# Patient Record
Sex: FEMALE | Race: WHITE | Employment: FULL TIME | URBAN - METROPOLITAN AREA
[De-identification: names, ages, dates, MRNs, and addresses within clinical notes are randomized per-mention and may not be internally consistent; named-entity substitution may affect disease eponyms.]

---

## 2023-03-20 DIAGNOSIS — I10 ESSENTIAL HYPERTENSION: Primary | ICD-10-CM

## 2023-03-20 DIAGNOSIS — E78.00 HYPERCHOLESTEROLEMIA: ICD-10-CM

## 2023-03-20 DIAGNOSIS — E53.8 VITAMIN B12 DEFICIENCY: ICD-10-CM

## 2023-03-20 PROBLEM — E66.3 OVERWEIGHT (BMI 25.0-29.9): Status: ACTIVE | Noted: 2023-03-20

## 2023-03-20 PROBLEM — J45.30 ASTHMA, MILD PERSISTENT (HHS-HCC): Status: ACTIVE | Noted: 2023-03-20

## 2023-03-20 PROBLEM — T75.3XXA MOTION SICKNESS: Status: ACTIVE | Noted: 2023-03-20

## 2023-03-20 RX ORDER — CYANOCOBALAMIN 1000 UG/ML
1000 INJECTION, SOLUTION INTRAMUSCULAR; SUBCUTANEOUS
Qty: 1 ML | Refills: 11 | Status: SHIPPED | OUTPATIENT
Start: 2023-03-20 | End: 2023-03-20

## 2023-03-20 RX ORDER — VALSARTAN 80 MG/1
1 TABLET ORAL DAILY
COMMUNITY
Start: 2020-07-09 | End: 2023-03-20 | Stop reason: SDUPTHER

## 2023-03-20 RX ORDER — ALBUTEROL SULFATE 90 UG/1
AEROSOL, METERED RESPIRATORY (INHALATION)
COMMUNITY
Start: 2016-06-03 | End: 2023-11-30 | Stop reason: WASHOUT

## 2023-03-20 RX ORDER — ETONOGESTREL AND ETHINYL ESTRADIOL VAGINAL RING .015; .12 MG/D; MG/D
RING VAGINAL
COMMUNITY
Start: 2015-08-10 | End: 2023-11-13 | Stop reason: SDUPTHER

## 2023-03-20 RX ORDER — CYANOCOBALAMIN 1000 UG/ML
INJECTION, SOLUTION INTRAMUSCULAR; SUBCUTANEOUS
COMMUNITY
Start: 2021-12-07 | End: 2023-03-20 | Stop reason: SDUPTHER

## 2023-03-20 RX ORDER — ALBUTEROL SULFATE 0.83 MG/ML
SOLUTION RESPIRATORY (INHALATION)
COMMUNITY
Start: 2021-03-27 | End: 2023-11-30 | Stop reason: WASHOUT

## 2023-03-20 RX ORDER — SCOLOPAMINE TRANSDERMAL SYSTEM 1 MG/1
PATCH, EXTENDED RELEASE TRANSDERMAL
COMMUNITY
Start: 2021-09-08 | End: 2023-08-10 | Stop reason: SDUPTHER

## 2023-03-20 RX ORDER — ROSUVASTATIN CALCIUM 10 MG/1
1 TABLET, COATED ORAL DAILY
COMMUNITY
Start: 2021-10-29 | End: 2023-03-20 | Stop reason: SDUPTHER

## 2023-03-20 RX ORDER — FLUTICASONE PROPIONATE AND SALMETEROL 113; 14 UG/1; UG/1
1 POWDER, METERED RESPIRATORY (INHALATION) DAILY
COMMUNITY
Start: 2022-04-14 | End: 2023-08-10 | Stop reason: ALTCHOICE

## 2023-03-20 RX ORDER — EPINEPHRINE 0.3 MG/.3ML
0.3 INJECTION SUBCUTANEOUS
COMMUNITY
Start: 2022-08-19

## 2023-03-20 RX ORDER — VALSARTAN 80 MG/1
80 TABLET ORAL DAILY
Qty: 90 TABLET | Refills: 3 | Status: SHIPPED | OUTPATIENT
Start: 2023-03-20 | End: 2023-03-20

## 2023-03-20 RX ORDER — ROSUVASTATIN CALCIUM 10 MG/1
10 TABLET, COATED ORAL DAILY
Qty: 90 TABLET | Refills: 3 | Status: SHIPPED | OUTPATIENT
Start: 2023-03-20 | End: 2023-03-20

## 2023-08-09 ASSESSMENT — ENCOUNTER SYMPTOMS
CHILLS: 0
FREQUENCY: 0
FEVER: 0
POLYPHAGIA: 0
TREMORS: 0
DIZZINESS: 0
HEADACHES: 0
HEMATURIA: 0
SLEEP DISTURBANCE: 0
WHEEZING: 0
DIARRHEA: 0
COLOR CHANGE: 0
FATIGUE: 0
VOMITING: 0
DIFFICULTY URINATING: 0
FACIAL SWELLING: 0
ARTHRALGIAS: 0
NERVOUS/ANXIOUS: 0
CONSTIPATION: 0
CHOKING: 0
COUGH: 0
ABDOMINAL DISTENTION: 0
WEAKNESS: 0
MYALGIAS: 0
SORE THROAT: 0
EYE DISCHARGE: 0
ANAL BLEEDING: 0
PALPITATIONS: 0
APPETITE CHANGE: 0
POLYDIPSIA: 0
JOINT SWELLING: 0
ABDOMINAL PAIN: 0
SHORTNESS OF BREATH: 0
EYE PAIN: 0
CONFUSION: 0
CHEST TIGHTNESS: 0
NUMBNESS: 0
NAUSEA: 0

## 2023-08-09 NOTE — PROGRESS NOTES
"Subjective   Patient ID: Lakia Gomez is a 28 y.o. female with a history of asthma, hypertension, and hyperlipidemia who presents for Annual Exam.    HPI the patient is presented for physical exam.  She does not voice any minor major complaints.  Her asthma is stable.  No recent exacerbation.  Denies wheezing or shortness of breath.  She exercises 3-4 times a week by HIIT workout at home.  Denies smoking, using illicit drugs or drinking alcohol.    Review of Systems   Constitutional:  Negative for appetite change, chills, fatigue and fever.   HENT:  Negative for congestion, ear pain, facial swelling, hearing loss, nosebleeds and sore throat.    Eyes:  Negative for pain, discharge and visual disturbance.   Respiratory:  Negative for cough, choking, chest tightness, shortness of breath and wheezing.    Cardiovascular:  Negative for chest pain, palpitations and leg swelling.   Gastrointestinal:  Negative for abdominal distention, abdominal pain, anal bleeding, constipation, diarrhea, nausea and vomiting.   Endocrine: Negative for cold intolerance, heat intolerance, polydipsia, polyphagia and polyuria.   Genitourinary:  Negative for difficulty urinating, frequency, hematuria and urgency.   Musculoskeletal:  Negative for arthralgias, gait problem, joint swelling and myalgias.   Skin:  Negative for color change and rash.   Neurological:  Negative for dizziness, tremors, syncope, weakness, numbness and headaches.   Psychiatric/Behavioral:  Negative for behavioral problems, confusion, sleep disturbance and suicidal ideas. The patient is not nervous/anxious.        Objective   /88   Temp 36.3 °C (97.3 °F)   Ht 1.6 m (5' 3\")   Wt 92.1 kg (203 lb)   LMP 07/30/2023   BMI 35.96 kg/m²     Physical Exam  Constitutional:       General: She is not in acute distress.     Appearance: Normal appearance.   HENT:      Head: Normocephalic and atraumatic.      Nose: Nose normal.   Eyes:      Extraocular Movements: Extraocular " movements intact.      Conjunctiva/sclera: Conjunctivae normal.      Pupils: Pupils are equal, round, and reactive to light.   Cardiovascular:      Rate and Rhythm: Normal rate and regular rhythm.      Pulses: Normal pulses.      Heart sounds: Normal heart sounds.   Pulmonary:      Effort: Pulmonary effort is normal.      Breath sounds: Normal breath sounds.   Abdominal:      General: Bowel sounds are normal.      Palpations: Abdomen is soft.   Musculoskeletal:         General: Normal range of motion.      Cervical back: Normal range of motion and neck supple.   Neurological:      General: No focal deficit present.      Mental Status: She is alert and oriented to person, place, and time.   Psychiatric:         Mood and Affect: Mood normal.         Behavior: Behavior normal.         Thought Content: Thought content normal.         Judgment: Judgment normal.         Assessment/Plan       Complete physical exam.  We obtained fasting blood work including CBC, CMP, lipid panel.  Continue to exercise exercise at least 30 minutes daily  Healthy diet recommended  Follow-up with gynecology tomorrow  Pap smear 2021  Follow-up with dentist twice year  Follow-up with dermatology yearly for annual exam    Nausea during menstrual periods  Take Zofran as needed    Asthma  Stable  Continue Symbicort daily  Albuterol inhaler and nebulizer as needed  Follow up with allergy     Sweaty hands  Continue Drysol as needed    Vitamin D deficiency  Continue vitamin D 1000 units daily    Motion sickness  The patient is going on a cruise in September  Scopolamine patches are prescribed     High blood pressure   Continue valsartan 80 mg daily  No added salt diet, do not add salt to your food  Try to exercise every other day for 30 minutes    Hyperlipidemia  Continue with the low fat, low cholesterol diet  I recommend Mediterranean diet, which include fish, chicken, vegetables and olive oil  Exercise daily for 30 minutes at least 3 times a  week  Continue rosuvastatin 10 mg at bedtime  Report any side effects, such as,  Muscle ache. Muscle weakness, abdominal pain or discomfort     Vitamin B 12 deficiency  Continue vitamin B12 injections monthly    BMI 35.96 kg/m²  Elevated BMI, ideal BMI is between 23-26 kg/m2  Low fat, low cholesterol diet, low carbohydrate diet  Continue to exercise 3-4 times a week  by HIIT workout    Follow-up in 3-6 months

## 2023-08-10 ENCOUNTER — OFFICE VISIT (OUTPATIENT)
Dept: PRIMARY CARE | Facility: CLINIC | Age: 28
End: 2023-08-10
Payer: COMMERCIAL

## 2023-08-10 VITALS
BODY MASS INDEX: 35.97 KG/M2 | DIASTOLIC BLOOD PRESSURE: 88 MMHG | TEMPERATURE: 97.3 F | HEIGHT: 63 IN | WEIGHT: 203 LBS | SYSTOLIC BLOOD PRESSURE: 138 MMHG

## 2023-08-10 DIAGNOSIS — J45.40 MODERATE PERSISTENT ASTHMA WITHOUT COMPLICATION (HHS-HCC): ICD-10-CM

## 2023-08-10 DIAGNOSIS — T75.3XXD MOTION SICKNESS, SUBSEQUENT ENCOUNTER: ICD-10-CM

## 2023-08-10 DIAGNOSIS — I10 ESSENTIAL HYPERTENSION: ICD-10-CM

## 2023-08-10 DIAGNOSIS — Z00.00 ROUTINE ADULT HEALTH MAINTENANCE: Primary | ICD-10-CM

## 2023-08-10 PROBLEM — G89.29 CHRONIC BILATERAL LOW BACK PAIN WITHOUT SCIATICA: Status: ACTIVE | Noted: 2018-10-02

## 2023-08-10 PROBLEM — Z91.010 PEANUT ALLERGY: Status: ACTIVE | Noted: 2023-08-10

## 2023-08-10 PROBLEM — M54.50 CHRONIC BILATERAL LOW BACK PAIN WITHOUT SCIATICA: Status: ACTIVE | Noted: 2018-10-02

## 2023-08-10 PROBLEM — N93.8 DUB (DYSFUNCTIONAL UTERINE BLEEDING): Status: ACTIVE | Noted: 2023-08-10

## 2023-08-10 PROBLEM — R11.0 NAUSEA IN ADULT: Status: ACTIVE | Noted: 2023-08-10

## 2023-08-10 PROBLEM — Z91.018 NUT ALLERGY: Status: ACTIVE | Noted: 2023-08-10

## 2023-08-10 PROBLEM — J30.1 ALLERGIC RHINITIS DUE TO POLLEN: Status: ACTIVE | Noted: 2023-08-10

## 2023-08-10 PROCEDURE — 99395 PREV VISIT EST AGE 18-39: CPT | Performed by: PHYSICIAN ASSISTANT

## 2023-08-10 PROCEDURE — 85025 COMPLETE CBC W/AUTO DIFF WBC: CPT | Performed by: PHYSICIAN ASSISTANT

## 2023-08-10 PROCEDURE — 3075F SYST BP GE 130 - 139MM HG: CPT | Performed by: PHYSICIAN ASSISTANT

## 2023-08-10 PROCEDURE — 80053 COMPREHEN METABOLIC PANEL: CPT | Performed by: PHYSICIAN ASSISTANT

## 2023-08-10 PROCEDURE — 80061 LIPID PANEL: CPT | Performed by: PHYSICIAN ASSISTANT

## 2023-08-10 PROCEDURE — 3079F DIAST BP 80-89 MM HG: CPT | Performed by: PHYSICIAN ASSISTANT

## 2023-08-10 PROCEDURE — 1036F TOBACCO NON-USER: CPT | Performed by: PHYSICIAN ASSISTANT

## 2023-08-10 RX ORDER — TRETINOIN 0.25 MG/G
CREAM TOPICAL
COMMUNITY
Start: 2023-08-06 | End: 2023-11-30 | Stop reason: WASHOUT

## 2023-08-10 RX ORDER — ALUMINUM CHLORIDE 20 %
SOLUTION, NON-ORAL TOPICAL
COMMUNITY
Start: 2022-11-09 | End: 2023-11-01 | Stop reason: ALTCHOICE

## 2023-08-10 RX ORDER — BUDESONIDE AND FORMOTEROL FUMARATE DIHYDRATE 160; 4.5 UG/1; UG/1
AEROSOL RESPIRATORY (INHALATION)
COMMUNITY
Start: 2023-04-04

## 2023-08-10 RX ORDER — AZELASTINE HCL 205.5 UG/1
SPRAY NASAL 2 TIMES DAILY
COMMUNITY
Start: 2023-04-04

## 2023-08-10 RX ORDER — CLINDAMYCIN PHOSPHATE 10 UG/ML
LOTION TOPICAL
COMMUNITY
Start: 2023-08-05 | End: 2023-11-01 | Stop reason: ALTCHOICE

## 2023-08-10 RX ORDER — ONDANSETRON 4 MG/1
TABLET, ORALLY DISINTEGRATING ORAL
COMMUNITY
Start: 2023-06-13 | End: 2023-11-30 | Stop reason: SDUPTHER

## 2023-08-10 RX ORDER — VIT C/E/ZN/COPPR/LUTEIN/ZEAXAN 250MG-90MG
1000 CAPSULE ORAL
COMMUNITY

## 2023-08-10 RX ORDER — MONTELUKAST SODIUM 10 MG/1
10 TABLET ORAL NIGHTLY
COMMUNITY
End: 2023-08-10 | Stop reason: ALTCHOICE

## 2023-08-10 RX ORDER — KETOTIFEN FUMARATE 0.35 MG/ML
SOLUTION/ DROPS OPHTHALMIC
COMMUNITY
Start: 2023-04-04 | End: 2023-11-30 | Stop reason: WASHOUT

## 2023-08-10 RX ORDER — SCOLOPAMINE TRANSDERMAL SYSTEM 1 MG/1
1 PATCH, EXTENDED RELEASE TRANSDERMAL
Qty: 4 PATCH | Refills: 0 | Status: SHIPPED | OUTPATIENT
Start: 2023-08-10 | End: 2023-08-10

## 2023-08-10 ASSESSMENT — PROMIS GLOBAL HEALTH SCALE
RATE_QUALITY_OF_LIFE: EXCELLENT
RATE_MENTAL_HEALTH: EXCELLENT
RATE_AVERAGE_FATIGUE: MILD
RATE_AVERAGE_PAIN: 0
RATE_PHYSICAL_HEALTH: GOOD
RATE_GENERAL_HEALTH: VERY GOOD
CARRYOUT_PHYSICAL_ACTIVITIES: COMPLETELY
EMOTIONAL_PROBLEMS: RARELY
RATE_SOCIAL_SATISFACTION: EXCELLENT
CARRYOUT_SOCIAL_ACTIVITIES: EXCELLENT

## 2023-08-10 ASSESSMENT — COLUMBIA-SUICIDE SEVERITY RATING SCALE - C-SSRS
2. HAVE YOU ACTUALLY HAD ANY THOUGHTS OF KILLING YOURSELF?: NO
6. HAVE YOU EVER DONE ANYTHING, STARTED TO DO ANYTHING, OR PREPARED TO DO ANYTHING TO END YOUR LIFE?: NO
1. IN THE PAST MONTH, HAVE YOU WISHED YOU WERE DEAD OR WISHED YOU COULD GO TO SLEEP AND NOT WAKE UP?: NO

## 2023-08-10 ASSESSMENT — PATIENT HEALTH QUESTIONNAIRE - PHQ9
2. FEELING DOWN, DEPRESSED OR HOPELESS: NOT AT ALL
SUM OF ALL RESPONSES TO PHQ9 QUESTIONS 1 AND 2: 0
1. LITTLE INTEREST OR PLEASURE IN DOING THINGS: NOT AT ALL

## 2023-08-10 ASSESSMENT — ENCOUNTER SYMPTOMS
DEPRESSION: 0
OCCASIONAL FEELINGS OF UNSTEADINESS: 0
LOSS OF SENSATION IN FEET: 0

## 2023-08-11 ENCOUNTER — TELEPHONE (OUTPATIENT)
Dept: PRIMARY CARE | Facility: CLINIC | Age: 28
End: 2023-08-11
Payer: COMMERCIAL

## 2023-08-11 NOTE — TELEPHONE ENCOUNTER
----- Message from Johanna Bullard PA-C sent at 8/11/2023 12:32 PM EDT -----  Please call her with blood test results.  Everything is normal

## 2023-09-02 LAB
ALLERGEN ANIMAL: CAT DANDER IGE (KU/L): <0.1 KU/L
ALLERGEN ANIMAL: DOG DANDER IGE (KU/L): <0.1 KU/L
ALLERGEN GRASS: BERMUDA GRASS (CYNODON DACTYLON) IGE (KU/L): <0.1 KU/L
ALLERGEN GRASS: JOHNSON GRASS (SORGHUM HALEPENSE) IGE (KU/L): <0.1 KU/L
ALLERGEN GRASS: MEADOW GRASS, KENTUCKY BLUE (POA PRATENSIS )IGE (KU/L): <0.1 KU/L
ALLERGEN GRASS: TIMOTHY GRASS (PHLEUM PRATENSE) IGE (KU/L): <0.1 KU/L
ALLERGEN INSECT: COCKROACH IGE: <0.1 KU/L
ALLERGEN MICROORGANISM: ALTERNARIA ALTERNATA IGE (KU/L): <0.1 KU/L
ALLERGEN MICROORGANISM: ASPERGILLUS FUMIGATUS IGE (KU/L): <0.1 KU/L
ALLERGEN MICROORGANISM: CLADOSPORIUM HERBARUM IGE (KU/L): <0.1 KU/L
ALLERGEN MICROORGANISM: PENICILLIUM CHRYSOGENUM (P. NOTATUM) IGE (KU/L): <0.1 KU/L
ALLERGEN MITE: DERMATOPHAGOIDES FARINAE (HOUSE DUST MITE) IGE (KU/L): <0.1 KU/L
ALLERGEN MITE: DERMATOPHAGOIDES PTERONYSSINUS (HOUSE DUST MITE) IGE (KU/L): <0.1 KU/L
ALLERGEN TREE: BOX-ELDER (ACER NEGUNDO) IGE (KU/L): <0.1 KU/L
ALLERGEN TREE: COMMON SILVER BIRCH (BETULA VERRUCOSA) IGE (KU/L): <0.1 KU/L
ALLERGEN TREE: COTTONWOOD (POPULUS DELTOIDES) IGE (KU/L): <0.1 KU/L
ALLERGEN TREE: ELM (ULMUS AMERICANA) IGE (KU/L): <0.1 KU/L
ALLERGEN TREE: MAPLE LEAF SYCAMORE, LONDON PLANE IGE (KU/L): <0.1 KU/L
ALLERGEN TREE: MOUNTAIN JUNIPER (JUNIPERUS SABINOIDES) IGE (KU/L): <0.1 KU/L
ALLERGEN TREE: MULBERRY (MORUS ALBA) IGE (KU/L): <0.1 KU/L
ALLERGEN TREE: OAK (QUERCUS ALBA) IGE (KU/L): <0.1 KU/L
ALLERGEN TREE: PECAN, HICKORY (CARYA PECAN) IGE (KU/L): <0.1 KU/L
ALLERGEN TREE: WALNUT IGE: <0.1 KU/L
ALLERGEN TREE: WHITE ASH (FRAXINUS AMERICANA) IGE (KU/L): <0.1 KU/L
ALLERGEN WEED: COMMON PIGWEED (AMARANTHUS RETROFLEXUS) IGE (KU/L): <0.1 KU/L
ALLERGEN WEED: COMMON RAGWEED (AMB. ARTEMISIIFOLIA/A. ELATIOR) IGE (KU/L): <0.1 KU/L
ALLERGEN WEED: GOOSEFOOT, LAMB'S QUARTERS (CHENOPODIUM ALBUM) IGE (KU/L): <0.1 KU/L
ALLERGEN WEED: PLANTAIN (ENGLISH), RIBWORT (PLANTAGO LANCEOLATA) IGE (KU/L): <0.1 KU/L
ALLERGEN WEED: PRICKLY SALTWORT/RUSSIAN THISTLE (SALSOLA KALI) IGE (KU/L): <0.1 KU/L
ALLERGEN WEED: SHEEP SORREL (RUMEX ACETOSELLA) IGE (KU/L): <0.1 KU/L
IMMUNOCAP IGE: 24.6 KU/L (ref 0–214)
IMMUNOCAP INTERPRETATION: NORMAL

## 2023-09-07 LAB
ALLERGEN ANIMAL: MOUSE EPITHELIUM IGE (KU/L): <0.1 KU/L
ALLERGEN GRASS: SWEET VERNAL GRASS (ANTHOXANTHUM ODORATUM) IGE (KU/L): <0.1 KU/L
IMMUNOCAP INTERPRETATION (ARUP): NORMAL

## 2023-10-27 PROBLEM — N92.6 IRREGULAR MENSES: Status: ACTIVE | Noted: 2023-10-27

## 2023-10-27 PROBLEM — D22.4 MELANOCYTIC NEVI OF SCALP AND NECK: Status: ACTIVE | Noted: 2023-05-03

## 2023-10-27 PROBLEM — D22.60 MELANOCYTIC NEVI OF UNSPECIFIED UPPER LIMB, INCLUDING SHOULDER: Status: ACTIVE | Noted: 2023-05-03

## 2023-10-27 PROBLEM — L70.0 ACNE VULGARIS: Status: ACTIVE | Noted: 2023-05-03

## 2023-10-27 PROBLEM — D22.5 MELANOCYTIC NEVI OF TRUNK: Status: ACTIVE | Noted: 2023-05-03

## 2023-10-27 PROBLEM — N92.1 MENORRHAGIA WITH IRREGULAR CYCLE: Status: ACTIVE | Noted: 2023-10-27

## 2023-10-27 PROBLEM — D22.70 MELANOCYTIC NEVI OF UNSPECIFIED LOWER LIMB, INCLUDING HIP: Status: ACTIVE | Noted: 2023-05-03

## 2023-10-27 PROBLEM — N88.9 LESION OF CERVIX: Status: ACTIVE | Noted: 2023-10-27

## 2023-10-27 PROBLEM — L81.4 OTHER MELANIN HYPERPIGMENTATION: Status: ACTIVE | Noted: 2023-05-03

## 2023-10-27 RX ORDER — MONTELUKAST SODIUM 10 MG/1
10 TABLET ORAL NIGHTLY
COMMUNITY
End: 2023-11-01 | Stop reason: ALTCHOICE

## 2023-11-01 ENCOUNTER — PHARMACY VISIT (OUTPATIENT)
Dept: PHARMACY | Facility: CLINIC | Age: 28
End: 2023-11-01
Payer: COMMERCIAL

## 2023-11-01 ENCOUNTER — OFFICE VISIT (OUTPATIENT)
Dept: DERMATOLOGY | Facility: CLINIC | Age: 28
End: 2023-11-01
Payer: COMMERCIAL

## 2023-11-01 DIAGNOSIS — D22.9 MULTIPLE BENIGN NEVI: ICD-10-CM

## 2023-11-01 DIAGNOSIS — L70.0 ACNE VULGARIS: Primary | ICD-10-CM

## 2023-11-01 DIAGNOSIS — L74.519 PRIMARY FOCAL HYPERHIDROSIS: ICD-10-CM

## 2023-11-01 PROCEDURE — 3075F SYST BP GE 130 - 139MM HG: CPT | Performed by: DERMATOLOGY

## 2023-11-01 PROCEDURE — 99214 OFFICE O/P EST MOD 30 MIN: CPT | Performed by: DERMATOLOGY

## 2023-11-01 PROCEDURE — 3079F DIAST BP 80-89 MM HG: CPT | Performed by: DERMATOLOGY

## 2023-11-01 PROCEDURE — RXMED WILLOW AMBULATORY MEDICATION CHARGE

## 2023-11-01 PROCEDURE — 1036F TOBACCO NON-USER: CPT | Performed by: DERMATOLOGY

## 2023-11-01 RX ORDER — SPIRONOLACTONE 50 MG/1
50 TABLET, FILM COATED ORAL DAILY
Qty: 60 TABLET | Refills: 4 | Status: SHIPPED | OUTPATIENT
Start: 2023-11-01 | End: 2024-04-22 | Stop reason: SDUPTHER

## 2023-11-01 RX ORDER — CLINDAMYCIN PHOSPHATE 10 UG/ML
LOTION TOPICAL
Qty: 60 ML | Refills: 11 | Status: SHIPPED | OUTPATIENT
Start: 2023-11-01 | End: 2024-10-30

## 2023-11-01 RX ORDER — BENZOYL PEROXIDE 50 MG/ML
LIQUID TOPICAL
Qty: 237 G | Refills: 11 | Status: SHIPPED | OUTPATIENT
Start: 2023-11-01 | End: 2024-10-30

## 2023-11-01 NOTE — PROGRESS NOTES
Subjective     Lakia Gomez is a 28 y.o. female who presents for the following: Skin Check, Acne (BPO wash, Clindamycin lotion, Tretinoin cream. ), and Excessive Sweating (Drysol. ).         Review of Systems:  No other skin or systemic complaints other than what is documented elsewhere in the note.    The following portions of the chart were reviewed this encounter and updated as appropriate:   Tobacco  Allergies  Meds  Problems  Med Hx  Surg Hx         Skin Cancer History  No skin cancer on file.      Specialty Problems          Dermatology Problems    Acne vulgaris    Melanocytic nevi of scalp and neck    Melanocytic nevi of trunk    Melanocytic nevi of unspecified lower limb, including hip    Melanocytic nevi of unspecified upper limb, including shoulder    Other melanin hyperpigmentation        Objective   Well appearing patient in no apparent distress; mood and affect are within normal limits.    A full skin examination was performed. All findings within normal limits unless otherwise noted below.    Assessment/Plan   1. Acne vulgaris  Head - Anterior (Face)  Few open and close comedones on forehead, cheeks, nose. Two cystic erythematous small nodules along chin. Chest and back are clear    -mild, predominantly comedonal, without evidence of scarring  -We reviewed the pathogenesis of acne in detail including multifactorial contributing components including components of follicular occlusion, hormonal influences, and inflammatory processes.   -Treatment options discussed with the patient and family.   -Stop use of Tretinoin 0.025%  cream due to dryness and irritation.  -Continue use of an benzoyl peroxide wash to face, chest, and back 1-2x/day. Warned that benzoyl peroxide may bleach sheets and towels.   -Continue use of clindamycin 1% lotion to face, chest, and back once daily in the morning  -For patients with hormonally mediated acne, hormonal therapies may be of benefit, including oral  contraceptive pills and spironolactone.  Spironolactone was reviewed in detail including mechanism of action.  We reviewed side effects associated with it including (but not limited to) hyperkalemia, birth defects, dizziness, breast tenderness, and spotting. In healthy individuals with no history of renal dysfunction, monitoring potassium levels during therapy is not required. Begin use of spironolactone 50mg daily for two weeks. Then take 75mg for 1 week. Then remain at 100mg daily..  -Efficacy for any new acne regimen may take 6-8 weeks prior to seeing improvement  -Acne may initially flare prior to improving.  -Photographs taken today    Related Procedures  Follow Up In Dermatology    2. Primary focal hyperhidrosis (2)  Left Hand - Anterior, Right Hand - Anterior  No evidence of sweating today    Well controlled on drysol applied once per week. Will provide refills today.    3. Multiple benign nevi  scattered regular brown macules and papules     Benign melanocytic nevi  - Discussed benign nature and that no treatment is necessary unless it becomes painful or increases in size. Patient opts for clinical monitoring at this time.    - Sun protective behavior reviewed and encouraged including the use of over-the-counter sunscreen with SPF30+ daily (reapply every 1.5 hours when outdoors), UPF clothing, broad rimmed hats, sunglasses, and avoidance of midday sun. Home skin monitoring encouraged and how to monitor for skin cancer (changing or new moles, new rapidly growing or non-healing lesions) reviewed. Patient encouraged to call with interval concerns or changes.         Follow up in 4 months for re-evaluation of acne

## 2023-11-13 DIAGNOSIS — Z30.44 ENCOUNTER FOR SURVEILLANCE OF VAGINAL RING HORMONAL CONTRACEPTIVE DEVICE: Primary | ICD-10-CM

## 2023-11-13 RX ORDER — ETONOGESTREL AND ETHINYL ESTRADIOL VAGINAL RING .015; .12 MG/D; MG/D
1 RING VAGINAL
Qty: 1 EACH | Refills: 2 | Status: SHIPPED | OUTPATIENT
Start: 2023-11-13 | End: 2023-11-30 | Stop reason: SDUPTHER

## 2023-11-14 ENCOUNTER — PHARMACY VISIT (OUTPATIENT)
Dept: PHARMACY | Facility: CLINIC | Age: 28
End: 2023-11-14
Payer: COMMERCIAL

## 2023-11-14 ENCOUNTER — APPOINTMENT (OUTPATIENT)
Dept: OBSTETRICS AND GYNECOLOGY | Facility: CLINIC | Age: 28
End: 2023-11-14
Payer: COMMERCIAL

## 2023-11-28 ENCOUNTER — PHARMACY VISIT (OUTPATIENT)
Dept: PHARMACY | Facility: CLINIC | Age: 28
End: 2023-11-28
Payer: COMMERCIAL

## 2023-11-30 ENCOUNTER — PHARMACY VISIT (OUTPATIENT)
Dept: PHARMACY | Facility: CLINIC | Age: 28
End: 2023-11-30
Payer: COMMERCIAL

## 2023-11-30 ENCOUNTER — OFFICE VISIT (OUTPATIENT)
Dept: OBSTETRICS AND GYNECOLOGY | Facility: CLINIC | Age: 28
End: 2023-11-30
Payer: COMMERCIAL

## 2023-11-30 VITALS
DIASTOLIC BLOOD PRESSURE: 82 MMHG | SYSTOLIC BLOOD PRESSURE: 120 MMHG | WEIGHT: 201 LBS | HEIGHT: 63 IN | BODY MASS INDEX: 35.61 KG/M2

## 2023-11-30 DIAGNOSIS — Z01.419 WELL WOMAN EXAM WITH ROUTINE GYNECOLOGICAL EXAM: ICD-10-CM

## 2023-11-30 DIAGNOSIS — Z30.44 ENCOUNTER FOR SURVEILLANCE OF VAGINAL RING HORMONAL CONTRACEPTIVE DEVICE: Primary | ICD-10-CM

## 2023-11-30 PROCEDURE — 99395 PREV VISIT EST AGE 18-39: CPT | Performed by: OBSTETRICS & GYNECOLOGY

## 2023-11-30 PROCEDURE — 3074F SYST BP LT 130 MM HG: CPT | Performed by: OBSTETRICS & GYNECOLOGY

## 2023-11-30 PROCEDURE — RXMED WILLOW AMBULATORY MEDICATION CHARGE

## 2023-11-30 PROCEDURE — 1036F TOBACCO NON-USER: CPT | Performed by: OBSTETRICS & GYNECOLOGY

## 2023-11-30 PROCEDURE — 3079F DIAST BP 80-89 MM HG: CPT | Performed by: OBSTETRICS & GYNECOLOGY

## 2023-11-30 RX ORDER — ETONOGESTREL AND ETHINYL ESTRADIOL VAGINAL RING .015; .12 MG/D; MG/D
1 RING VAGINAL
Qty: 3 EACH | Refills: 3 | Status: SHIPPED | OUTPATIENT
Start: 2023-11-30

## 2023-11-30 RX ORDER — ONDANSETRON 4 MG/1
TABLET, ORALLY DISINTEGRATING ORAL
Qty: 30 TABLET | Refills: 6 | Status: SHIPPED | OUTPATIENT
Start: 2023-11-30

## 2023-11-30 NOTE — PROGRESS NOTES
Chief Complaint   Patient presents with    Annual Exam     Annual Exam with Pap Smear    G0    No complaints.  Request refill of Nuva Ring and Zofran.    Chaperone declined.     Was seen in August for cervical biopsy simply due to dark area of cervix which was suggestive of endometriosis.  Biopsy did not confirm this.  Prior Paps have been normal.  Patient inquires about frequency of Paps we discussed every 1 to 3 years.  As Paps have been normal, will hold off this year.    Uses Zofran for nausea during her cycles, approximately 2/day.    Works allergy -     REVIEW OF SYSTEMS    Please see HPI for reported pertinent positives, which would supersede this ROS    Constitutional:  Denies fever, chills, wt gain or loss, fatigue    Genito-Urinary:  Denies genital lesion or sores, vaginal dryness, itching  or pain.  No abnormal vaginal discharge or unexplained vaginal bleeding.  No dysuria, urinary incontinence or frequency.  Denies pelvic pain, dysmenorrhea or dyspareunia.    Eyes:  Denies vision changes, dryness  ENT:  No hearing loss, sinus pain or congestion, nosebleeds  Cardiovascular:  No chest pain or palpitations  Respiratory:  No SOB, cough, wheezing  GI:  No Nausea, vomiting, diarrhea, constipation, abdominal pain  Musculoskeletal:  No new back pain. joint pain, peripheral edema  Skin:  No rash or skin lesion  Neurologic:  No HA, numbness or dizziness  Psychiatric:  No new anxiety or depression  Endocrine:  No loss of hair or hirsutism  Hematologic/lymphatic:  No swollen lymph nodes.  No reported tendency for easy bruising or bleeding    Patient admits to no other systemic complaints      Vitals:    11/30/23 0835   BP: 120/82       PHYSICAL EXAM:    PSYCH:  Pt is alert, oriented and cooperative    SKIN: warm, dry, w/o lesion    HEAD AND FACE:  External inspection of eyes, ears, functional cranial nerves normal and intact    THYROID:  No thyromegaly    CARDIOVASCULAR:  Warm and well Perfused    PULMONARY:  No  respiratory distress    ABDOMEN:  soft, nontender.  No mass or organomegaly.      BREAST:  Breasts are symmetric to inspection and palpation.  No mass palpable bilaterally.  There is no axillary lymphadenopathy    PELVIC:    External genitalia, urethra, perianal region normal to inspection and palpation if indicated.  No inguinal LA    Vagina without lesions.    Cervix seen and visually normal      Bimanual exam:      No pelvic mass palpable    Uterus nontender, midline in pelvis    No adnexal masses or tenderness    Assessment/Plan    Diagnoses and all orders for this visit:  Encounter for surveillance of vaginal ring hormonal contraceptive device  -     etonogestreL-ethinyl estradioL (Nuvaring) 0.12-0.015 mg/24 hr vaginal ring; Insert 1 each into the vagina every 28 (twenty-eight) days.  -     ondansetron ODT (Zofran-ODT) 4 mg disintegrating tablet; Take 1 tablet every 6 hours as needed for nausea  Well woman exam with routine gynecological exam

## 2023-12-11 ENCOUNTER — PHARMACY VISIT (OUTPATIENT)
Dept: PHARMACY | Facility: CLINIC | Age: 28
End: 2023-12-11
Payer: COMMERCIAL

## 2023-12-11 PROCEDURE — RXMED WILLOW AMBULATORY MEDICATION CHARGE

## 2023-12-21 PROCEDURE — RXMED WILLOW AMBULATORY MEDICATION CHARGE

## 2023-12-22 ENCOUNTER — PHARMACY VISIT (OUTPATIENT)
Dept: PHARMACY | Facility: CLINIC | Age: 28
End: 2023-12-22
Payer: COMMERCIAL

## 2023-12-27 ENCOUNTER — PHARMACY VISIT (OUTPATIENT)
Dept: PHARMACY | Facility: CLINIC | Age: 28
End: 2023-12-27
Payer: COMMERCIAL

## 2023-12-27 PROCEDURE — RXMED WILLOW AMBULATORY MEDICATION CHARGE

## 2024-01-11 ENCOUNTER — PHARMACY VISIT (OUTPATIENT)
Dept: PHARMACY | Facility: CLINIC | Age: 29
End: 2024-01-11
Payer: COMMERCIAL

## 2024-01-11 PROCEDURE — RXMED WILLOW AMBULATORY MEDICATION CHARGE

## 2024-01-12 ENCOUNTER — PHARMACY VISIT (OUTPATIENT)
Dept: PHARMACY | Facility: CLINIC | Age: 29
End: 2024-01-12

## 2024-02-07 ENCOUNTER — OFFICE VISIT (OUTPATIENT)
Dept: DERMATOLOGY | Facility: CLINIC | Age: 29
End: 2024-02-07
Payer: COMMERCIAL

## 2024-02-07 DIAGNOSIS — L70.0 ACNE VULGARIS: ICD-10-CM

## 2024-02-07 PROCEDURE — 1036F TOBACCO NON-USER: CPT | Performed by: DERMATOLOGY

## 2024-02-07 PROCEDURE — 99213 OFFICE O/P EST LOW 20 MIN: CPT | Performed by: DERMATOLOGY

## 2024-02-07 RX ORDER — SPIRONOLACTONE 25 MG/1
25 TABLET ORAL DAILY
Qty: 30 TABLET | Refills: 11 | Status: SHIPPED | OUTPATIENT
Start: 2024-02-07 | End: 2025-02-06

## 2024-02-07 ASSESSMENT — DERMATOLOGY PATIENT ASSESSMENT
HAVE YOU HAD OR DO YOU HAVE VASCULAR DISEASE: NO
HAVE YOU HAD OR DO YOU HAVE A STAPH INFECTION: NO
ARE YOU TRYING TO GET PREGNANT: NO
ARE YOU ON BIRTH CONTROL: YES
ARE YOU AN ORGAN TRANSPLANT RECIPIENT: NO
DO YOU USE SUNSCREEN: DAILY
DO YOU HAVE IRREGULAR MENSTRUAL CYCLES: NO
DO YOU USE A TANNING BED: NO
DO YOU HAVE ANY NEW OR CHANGING LESIONS: NO

## 2024-02-07 ASSESSMENT — DERMATOLOGY QUALITY OF LIFE (QOL) ASSESSMENT
RATE HOW BOTHERED YOU ARE BY EFFECTS OF YOUR SKIN PROBLEMS ON YOUR ACTIVITIES (EG, GOING OUT, ACCOMPLISHING WHAT YOU WANT, WORK ACTIVITIES OR YOUR RELATIONSHIPS WITH OTHERS): 0 - NEVER BOTHERED
ARE THERE EXCLUSIONS OR EXCEPTIONS FOR THE QUALITY OF LIFE ASSESSMENT: NO
DATE THE QUALITY-OF-LIFE ASSESSMENT WAS COMPLETED: 66877
WHAT SINGLE SKIN CONDITION LISTED BELOW IS THE PATIENT ANSWERING THE QUALITY-OF-LIFE ASSESSMENT QUESTIONS ABOUT: ACNE
RATE HOW EMOTIONALLY BOTHERED YOU ARE BY YOUR SKIN PROBLEM (FOR EXAMPLE, WORRY, EMBARRASSMENT, FRUSTRATION): 0 - NEVER BOTHERED
RATE HOW BOTHERED YOU ARE BY SYMPTOMS OF YOUR SKIN PROBLEM (EG, ITCHING, STINGING BURNING, HURTING OR SKIN IRRITATION): 0 - NEVER BOTHERED

## 2024-02-07 ASSESSMENT — ITCH NUMERIC RATING SCALE: HOW SEVERE IS YOUR ITCHING?: 0

## 2024-02-07 ASSESSMENT — PATIENT GLOBAL ASSESSMENT (PGA): PATIENT GLOBAL ASSESSMENT: PATIENT GLOBAL ASSESSMENT:  1 - CLEAR

## 2024-02-07 NOTE — PROGRESS NOTES
Subjective     Lakia Gomez is a 28 y.o. female who presents for the following: Acne.     Last visit 11/01/2023. Patient was started on spironolactone, currently taking 100mg daily without any side effects. She is also using benzoyl peroxide 5% cleanser every other day due to dryness, and clindamycin 1% lotion on the back.     Acne is better but still flares. Patient is mainly concerned about comedones.     Review of Systems:  No other skin or systemic complaints other than what is documented elsewhere in the note.    The following portions of the chart were reviewed this encounter and updated as appropriate:         Skin Cancer History  No skin cancer on file.      Specialty Problems          Dermatology Problems    Acne vulgaris    Melanocytic nevi of scalp and neck    Melanocytic nevi of trunk    Melanocytic nevi of unspecified lower limb, including hip    Melanocytic nevi of unspecified upper limb, including shoulder    Other melanin hyperpigmentation        Objective   Well appearing patient in no apparent distress; mood and affect are within normal limits.    A full examination was performed including scalp, head, eyes, ears, nose, lips, neck, chest, axillae, abdomen, back, buttocks, bilateral upper extremities, bilateral lower extremities, hands, feet, fingers, toes, fingernails, and toenails. All findings within normal limits unless otherwise noted below.    Assessment/Plan   1. Acne vulgaris  Right Chin  Scattered comedones on the chin    Acne vulgaris - improved with current regimen with some residual open/closed comedones    The chronic and intermittently flaring nature of acne was reviewed with the patient today. Patient advised that acne is multifactorial. Treatment options were reviewed with the patient. Advised that typically takes 2-3 months to see 60-80% improvement and treatments may worsen acne appearance prior to improvement.     Wash face twice daily  Do not spot treat, treat the entire  surface area to treat current breakouts and prevent new breakouts    Treatment recommendations:  - S/p Tretinoin 0.025% cream and adapalene due to dryness and irritation.  -Continue use of an benzoyl peroxide 5% wash to face, chest, and back 1-2x/day. Warned that benzoyl peroxide may bleach sheets and towels.   -Continue use of clindamycin 1% lotion to face, chest, and back once daily in the morning  -For patients with hormonally mediated acne, hormonal therapies may be of benefit, including oral contraceptive pills and spironolactone.   - Increase to spironolactone 125mg daily. Spironolactone was reviewed in detail including mechanism of action. We reviewed side effects associated with it including (but not limited to) hyperkalemia, birth defects, dizziness, breast tenderness, and spotting. In healthy individuals with no history of renal dysfunction, monitoring potassium levels during therapy is not required.   - Will reach out to our faculty colleagues to see if there are any estheticians in the area to recommend for comedone extractions    Follow up in 3 months    spironolactone (Aldactone) 25 mg tablet - Right Chin  Take 1 tablet (25 mg) by mouth once daily.    Related Procedures  Follow Up In Dermatology  Follow Up In Dermatology - Established Patient    Related Medications  spironolactone (Aldactone) 50 mg tablet  Take 1 tablet by mouth once daily for two weeks, then 1 and 1/2 tablets by mouth daily for one week, then take 2 tablets daily thereafter as directed    benzoyl peroxide 5 % external wash  USE TO WASH FACE EVERY MORNING    clindamycin (Cleocin T) 1 % lotion  APPLY TO AFFECTED AREA(S) TOPICALLY TWO TIMES A DAY AFTER FACE WASHING      Rickey Wilhelm MD   PGY3, Department of Dermatology    I saw and evaluated the patient. I personally obtained the key and critical portions of the history and physical exam or was physically present for key and critical portions performed by the resident/fellow. I reviewed the  resident/fellow's documentation and discussed the patient with the resident/fellow. I agree with the resident/fellow's medical decision making as documented in the note.    Mark Miller MD PhD

## 2024-02-08 PROCEDURE — RXMED WILLOW AMBULATORY MEDICATION CHARGE

## 2024-02-09 PROCEDURE — RXMED WILLOW AMBULATORY MEDICATION CHARGE

## 2024-02-10 ENCOUNTER — PHARMACY VISIT (OUTPATIENT)
Dept: PHARMACY | Facility: CLINIC | Age: 29
End: 2024-02-10
Payer: COMMERCIAL

## 2024-02-26 DIAGNOSIS — R19.7 DIARRHEA, UNSPECIFIED TYPE: Primary | ICD-10-CM

## 2024-02-26 RX ORDER — METRONIDAZOLE 500 MG/1
500 TABLET ORAL 3 TIMES DAILY
Qty: 30 TABLET | Refills: 0 | Status: SHIPPED | OUTPATIENT
Start: 2024-02-26 | End: 2024-03-09

## 2024-02-28 ENCOUNTER — PHARMACY VISIT (OUTPATIENT)
Dept: PHARMACY | Facility: CLINIC | Age: 29
End: 2024-02-28
Payer: COMMERCIAL

## 2024-02-28 PROCEDURE — RXMED WILLOW AMBULATORY MEDICATION CHARGE

## 2024-03-12 DIAGNOSIS — I10 ESSENTIAL HYPERTENSION: ICD-10-CM

## 2024-03-12 DIAGNOSIS — E78.00 HYPERCHOLESTEROLEMIA: ICD-10-CM

## 2024-03-12 PROCEDURE — RXMED WILLOW AMBULATORY MEDICATION CHARGE

## 2024-03-12 RX ORDER — ROSUVASTATIN CALCIUM 10 MG/1
10 TABLET, COATED ORAL DAILY
Qty: 90 TABLET | Refills: 3 | Status: SHIPPED | OUTPATIENT
Start: 2024-03-12 | End: 2025-03-12

## 2024-03-12 RX ORDER — VALSARTAN 80 MG/1
80 TABLET ORAL DAILY
Qty: 90 TABLET | Refills: 3 | Status: SHIPPED | OUTPATIENT
Start: 2024-03-12 | End: 2025-03-12

## 2024-03-13 ENCOUNTER — PHARMACY VISIT (OUTPATIENT)
Dept: PHARMACY | Facility: CLINIC | Age: 29
End: 2024-03-13
Payer: COMMERCIAL

## 2024-04-22 ENCOUNTER — PHARMACY VISIT (OUTPATIENT)
Dept: PHARMACY | Facility: CLINIC | Age: 29
End: 2024-04-22
Payer: COMMERCIAL

## 2024-04-22 DIAGNOSIS — L70.0 ACNE VULGARIS: ICD-10-CM

## 2024-04-22 PROCEDURE — RXMED WILLOW AMBULATORY MEDICATION CHARGE

## 2024-04-23 RX ORDER — SPIRONOLACTONE 50 MG/1
50 TABLET, FILM COATED ORAL DAILY
Qty: 60 TABLET | Refills: 4 | Status: SHIPPED | OUTPATIENT
Start: 2024-04-23

## 2024-04-24 PROCEDURE — RXMED WILLOW AMBULATORY MEDICATION CHARGE

## 2024-04-25 ENCOUNTER — PHARMACY VISIT (OUTPATIENT)
Dept: PHARMACY | Facility: CLINIC | Age: 29
End: 2024-04-25
Payer: COMMERCIAL

## 2024-05-08 ENCOUNTER — APPOINTMENT (OUTPATIENT)
Dept: DERMATOLOGY | Facility: CLINIC | Age: 29
End: 2024-05-08
Payer: COMMERCIAL

## 2024-05-16 DIAGNOSIS — E53.8 VITAMIN B12 DEFICIENCY: ICD-10-CM

## 2024-05-16 PROCEDURE — RXMED WILLOW AMBULATORY MEDICATION CHARGE

## 2024-05-16 RX ORDER — CYANOCOBALAMIN 1000 UG/ML
INJECTION, SOLUTION INTRAMUSCULAR; SUBCUTANEOUS
Qty: 1 ML | Refills: 11 | Status: SHIPPED | OUTPATIENT
Start: 2024-05-16 | End: 2025-05-16

## 2024-05-17 ENCOUNTER — PHARMACY VISIT (OUTPATIENT)
Dept: PHARMACY | Facility: CLINIC | Age: 29
End: 2024-05-17
Payer: COMMERCIAL

## 2024-05-17 PROCEDURE — RXMED WILLOW AMBULATORY MEDICATION CHARGE

## 2024-05-18 PROCEDURE — RXMED WILLOW AMBULATORY MEDICATION CHARGE

## 2024-05-24 ENCOUNTER — PHARMACY VISIT (OUTPATIENT)
Dept: PHARMACY | Facility: CLINIC | Age: 29
End: 2024-05-24
Payer: COMMERCIAL

## 2024-06-12 PROCEDURE — RXMED WILLOW AMBULATORY MEDICATION CHARGE

## 2024-06-14 ENCOUNTER — PHARMACY VISIT (OUTPATIENT)
Dept: PHARMACY | Facility: CLINIC | Age: 29
End: 2024-06-14
Payer: COMMERCIAL

## 2024-06-17 PROCEDURE — RXMED WILLOW AMBULATORY MEDICATION CHARGE

## 2024-06-19 ENCOUNTER — PHARMACY VISIT (OUTPATIENT)
Dept: PHARMACY | Facility: CLINIC | Age: 29
End: 2024-06-19
Payer: COMMERCIAL

## 2024-07-02 ENCOUNTER — APPOINTMENT (OUTPATIENT)
Dept: DERMATOLOGY | Facility: CLINIC | Age: 29
End: 2024-07-02
Payer: COMMERCIAL

## 2024-07-02 DIAGNOSIS — L70.0 ACNE VULGARIS: Primary | ICD-10-CM

## 2024-07-02 DIAGNOSIS — L74.519 PRIMARY FOCAL HYPERHIDROSIS: ICD-10-CM

## 2024-07-02 DIAGNOSIS — D22.9 MULTIPLE BENIGN NEVI: ICD-10-CM

## 2024-07-02 DIAGNOSIS — L81.4 LENTIGO: ICD-10-CM

## 2024-07-02 DIAGNOSIS — Z12.83 SCREENING EXAM FOR SKIN CANCER: ICD-10-CM

## 2024-07-02 DIAGNOSIS — D18.01 HEMANGIOMA OF SKIN: ICD-10-CM

## 2024-07-02 PROCEDURE — 99213 OFFICE O/P EST LOW 20 MIN: CPT | Performed by: DERMATOLOGY

## 2024-07-02 RX ORDER — CLINDAMYCIN PHOSPHATE 10 UG/ML
LOTION TOPICAL 2 TIMES DAILY
Qty: 60 ML | Refills: 11 | Status: SHIPPED | OUTPATIENT
Start: 2024-07-02 | End: 2025-07-01

## 2024-07-02 RX ORDER — SPIRONOLACTONE 25 MG/1
25 TABLET ORAL DAILY
Qty: 30 TABLET | Refills: 11 | Status: SHIPPED | OUTPATIENT
Start: 2024-07-02 | End: 2025-07-02

## 2024-07-02 RX ORDER — SPIRONOLACTONE 100 MG/1
100 TABLET, FILM COATED ORAL DAILY
Qty: 30 TABLET | Refills: 11 | Status: SHIPPED | OUTPATIENT
Start: 2024-07-02

## 2024-07-02 NOTE — PROGRESS NOTES
Subjective     Lakia Gomez is a 29 y.o. female who presents for the following: Skin Check and Acne (Spironolactone 125 mg daily and Clindamycin lotion - doing well ).     Last visit 2/7/2023. At that visit, spironolactone was increased to 125mg daily. She denies any side effects including dizziness/lightheadedness, breast tenderness, menstrual irregularities, or increased urination. She is also using clindamycin 1% lotion daily. Has stopped using benzoyl peroxide 5% wash due to dryness. She still has occasional flares of her acne, but is significantly improved and overall she is very happy with her level of control.     Patient also notes increased sweating of her bilateral hands. She has previously been prescribed aluminum chloride with improvement. Requesting refills today.     Review of Systems:  No other skin or systemic complaints other than what is documented elsewhere in the note.    The following portions of the chart were reviewed this encounter and updated as appropriate:         Skin Cancer History  No skin cancer on file.      Specialty Problems          Dermatology Problems    Acne vulgaris    Melanocytic nevi of scalp and neck    Melanocytic nevi of trunk    Melanocytic nevi of unspecified lower limb, including hip    Melanocytic nevi of unspecified upper limb, including shoulder    Other melanin hyperpigmentation        Objective   Well appearing patient in no apparent distress; mood and affect are within normal limits.    A full examination was performed including scalp, head, eyes, ears, nose, lips, neck, chest, axillae, abdomen, back, buttocks, bilateral upper extremities, bilateral lower extremities, hands, feet, fingers, toes, fingernails, and toenails. All findings within normal limits unless otherwise noted below.    Assessment/Plan   1. Acne vulgaris  Head - Anterior (Face)  On the chin, there are scattered comedones.     Acne vulgaris - hormonal acne improved with current regimen    -Explained that the etiology of acne is multifactorial.    -S/p Tretinoin 0.025% cream, adapalene, and benzoyl peroxide 5% wash due to dryness and irritation.  -Continue use of clindamycin 1% lotion to face, chest, and back once daily in the morning  -Continue use of  spironolactone 125mg daily. We reviewed side effects including hyperkalemia, birth defects, dizziness, breast tenderness, and spotting. In healthy individuals with no history of renal dysfunction, monitoring potassium levels during therapy is not required.     Related Procedures  Follow Up In Dermatology - Established Patient    Related Medications  spironolactone (Aldactone) 50 mg tablet  Take 1 tablet by mouth once daily for two weeks, then 1 and 1/2 tablets by mouth daily for one week, then take 2 tablets daily thereafter as directed    clindamycin (Cleocin T) 1 % lotion  Apply topically 2 times a day. To entire face.    spironolactone (Aldactone) 100 mg tablet  Take 1 pill by mouth daily    spironolactone (Aldactone) 25 mg tablet  Take 1 tablet (25 mg) by mouth once daily.    2. Multiple benign nevi  Brown and tan macules and papules with reassuring findings on dermoscopy    -These lesions have benign, reassuring patterns on dermoscopy  -Recommend continued self observation, and to contact the office if any changes in nevi are noticed    3. Lentigo  Tan macules    -Benign appearing on exam  -Reassurance, recommend observation    4. Hemangioma of skin  Cherry red papules    -Discussed the nature of the diagnosis  -Reassurance, recommend continued observation    5. Screening exam for skin cancer        Full body skin exam  -No lesions concerning for malignancy on the remainder the skin exam today   - The ugly duckling sign was discussed. Monitor for any skin lesions that are different in color, shape, or size than others on body  -Sun protection was discussed. Recommend SPF 30+, hats with brims, sun protective clothing, and avoiding sun exposure  between 10 AM and 2 PM whenever possible  -Recommend regular skin exams or sooner if new or changing lesions       Related Procedures  Follow Up In Dermatology - Established Patient    6. Primary focal hyperhidrosis (2)  Left Hand - Anterior, Right Hand - Anterior  Increased perspiration on bilateral palmar hands.     -Patient notes increased sweating on her bilateral hands.   -Previously used aluminum chloride 20% solution with improvement. Refills sent.     Related Medications  aluminum chloride (Drysol) 20 % external solution  Apply to palms once weekly overnight. Wash treated area in the morning.      RTC in 1 year.     Zaida Alvarez DO     I saw and evaluated the patient. I personally obtained the key and critical portions of the history and physical exam or was physically present for key and critical portions performed by the resident/fellow. I reviewed the resident/fellow's documentation and discussed the patient with the resident/fellow. I agree with the resident/fellow's medical decision making as documented in the note.    Mark Miller MD PhD

## 2024-07-08 PROCEDURE — RXMED WILLOW AMBULATORY MEDICATION CHARGE

## 2024-07-09 ENCOUNTER — PHARMACY VISIT (OUTPATIENT)
Dept: PHARMACY | Facility: CLINIC | Age: 29
End: 2024-07-09
Payer: COMMERCIAL

## 2024-07-11 NOTE — PROGRESS NOTES
"PREFERRED CONTACT INFORMATION  Telephone: [phone]   Email: [email or web address]     HISTORY OF PRESENT ILLNESS  Ms. Lakia Gomez is a 29 y.o. female with PMH of ARC, moderate persistent asthma, food allergy, and drug allergy, who presents today for a virtual follow-up visit.     Food Allergy  Avoids: peanut, tree nuts, lemon  Tolerates: milk, egg, soy, wheat, fish, shellfish, legumes, seeds.     Interim history  - Since last visit in 5/2023 had a cookie from [email or web address] and had vomiting and hives.    History  - Had hives and nausea with peanut when very young, hives to touch with pistachio. A different time had lemon and developed lip swelling.     Carries epipen? yes  Used epipen? no  Antihistamine use in past week? yes    Eczema/ Atopic Dermatitis  No    Asthma  - Symbicort 160/4.5 2 puffs BID with PRN puffs up to a max of 12 puffs per day started on initial visit. Since then using PRN in the Winter and back in the .  Recurrent wheezing started at age: infant  Triggers: URIs, allergies  Treatments: Symbicort 160/4.5 2 puffs BID with PRN puffs up to a max of 12 puffs per day  Last rescue use: yes  Rescue inhaler use in the past week: yes  Nighttime awakenings the past week: no   History of hospitalizations? no  History of ER visits? not recent  Oral steroids in the past 12 months? no  Nocturnal cough? yes  Last Pulmonary Functions Testing Results:  No results found for: \"FEV1\", \"FVC\", \"JBY0CRT\", \"TLC\", \"DLCO\"     Rhinoconjunctivitis  Nasal symptoms: nasal discharge, congestion  Ocular symptoms: itchy eyes  Other symptoms: cough  Symptomatic months: Spring-Fall  Triggers: outdoor  Oral antihistamine use: fexofenadine 180 mg   Nasal topicals: azelastine, fluticasone  Eye topicals: ketotifen  Other medications: no  Prior testing? 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    PAST MEDICAL HISTORY  Past Medical History:   Diagnosis Date    Adverse effect of drug 07/12/2024    Knee pain 07/12/2024    Mild persistent asthma, uncomplicated ([facility]) 08/27/2021    Asthma, mild persistent    Other acute sinusitis 05/19/2016    Other acute sinusitis, recurrence not specified    Other acute sinusitis 06/03/2016    Other acute sinusitis    Other conditions influencing health status 04/11/2022    History of cough    Personal history of other diseases of the respiratory system 07/09/2020    History of asthma    Personal history of other diseases of the respiratory system 05/19/2016    History of acute sinusitis    Sprain of knee 07/12/2024    Unspecified asthma with (acute) exacerbation ([facility]) 06/03/2016    Asthmatic bronchitis, unspecified asthma severity, with acute exacerbation        PAST SURGICAL HISTORY  Past Surgical History:   Procedure Laterality Date    OTHER SURGICAL HISTORY  12/01/2020    [place] tooth extraction        ALLERGIES  Allergies   Allergen Reactions    Citrus And Derivatives Anaphylaxis    Lemon Oil Anaphylaxis     Throat gets itchy    Peanut Anaphylaxis    Tree Nuts Anaphylaxis    Prednisone Itching and Other       MEDICATIONS  Current Outpatient Medications on File Prior to Visit   Medication Sig Dispense Refill    ketotifen (Zaditor) 0.025 % (0.035 %) ophthalmic solution Administer into affected eye(s).      aluminum chloride (Drysol) 20 % external solution Apply to palms once weekly overnight. Wash treated area in the morning. 60 mL 11    azelastine 205.5 mcg (0.15 %) spray,non-aerosol Administer into affected nostril(s) twice a day.      budesonide-formoteroL (Symbicort) 160-4.5  mcg/actuation inhaler Inhale.      budesonide-formoteroL (Symbicort) 160-4.5 mcg/actuation inhaler INHALE 2 PUFFS TWO TIMES A DAY. USE EXTRA 2 PUFFS AS NEEDED, UP TO 12 PUFFS PER DAY 10.2 g 2    cholecalciferol (Vitamin D-3) 25 MCG (1000 UT) capsule Take 1 capsule (25 mcg) by mouth.      clindamycin (Cleocin T) 1 % lotion Apply topically 2 times a day to entire face. 60 mL 11    cyanocobalamin (Vitamin B-12) 1,000 mcg/mL injection INJECT 1 INTRAMUSCULARLY INTO THE SHOULDER, THIGH OR BUTTOCKS EVERY 30 DAYS 1 mL 11    EPINEPHrine 0.3 mg/0.3 mL injection syringe 0.3 mL (0.3 mg). As Directed      etonogestreL-ethinyl estradioL (Nuvaring) 0.12-0.015 mg/24 hr vaginal ring Insert 1 ring into the vagina every 28 (twenty-eight) days as directed 3 each 3    NuvaRing 0.12-0.015 mg/24 hr vaginal ring INSERT ONE RING VAGINALLY AND LEAVE FOR 3 WEEKS. REMOVE FOR 1 WEEK 3 each 3    ondansetron ODT (Zofran-ODT) 4 mg disintegrating tablet Dissolve 1 tablet in mouth every 6 hours as needed for nausea (Patient not taking: Reported on 7/2/2024) 30 tablet 6    rosuvastatin (Crestor) 10 mg tablet TAKE 1 TABLET BY MOUTH ONCE DAILY 90 tablet 3    spironolactone (Aldactone) 100 mg tablet Take 1 tablet (100 mg) by mouth once daily. 30 tablet 11    spironolactone (Aldactone) 25 mg tablet Take 1 tablet (25 mg) by mouth once daily. 30 tablet 11    spironolactone (Aldactone) 50 mg tablet Take 1 tablet by mouth once daily for two weeks, then 1 and 1/2 tablets by mouth daily for one week, then take 2 tablets daily thereafter as directed 60 tablet 4    valsartan (Diovan) 80 mg tablet TAKE 1 TABLET BY MOUTH ONCE DAILY 90 tablet 3    [DISCONTINUED] Symbicort 160-4.5 mcg/actuation inhaler INHALE 2 PUFFS BY MOUTH TWO TIMES A DAY. USE ADDITIONAL 2 PUFFS AS NEEDED, UP TO 12 PUFFS PER DAY 10.2 g 4     No current facility-administered medications on file prior to visit.     REVIEW OF SYSTEMS  Pertinent positives and negatives have been assessed in the HPI.  All other systems have been reviewed and are negative except as noted in the HPI.    PHYSICAL EXAMINATION   There were no vitals taken for this visit.    Constitutional: no acute distress and well developed  Ears/Nose/Mouth/Throat: oropharynx pink and moist, anicteric bilaterally  Respiratory: normal respiratory effort  Neuro: awake, alert, answers appropriately    LABS / TESTS  Skin Tests results from 7/12/2024   None    CBC w/ diff absolute eosinophils -   Eosinophils Absolute   Date Value Ref Range Status   08/19/2022 0.02 0.00 - 0.70 x10E9/L Final   07/09/2020 0.03 0.00 - 0.70 x10E9/L Final      Environmental serum IgE (specifics)   Lab Results   Component Value Date    ICIGE 24.6 09/01/2023    WHITEASH <0.10 09/01/2023    SILVERBIRCH <0.10 09/01/2023    BOXELDER <0.10 09/01/2023    MOUNTJUNIPER <0.10 09/01/2023    COTTONWOOD <0.10 09/01/2023    ELM <0.10 09/01/2023    MULBERRY <0.10 09/01/2023    PECANHICKORY <0.10 09/01/2023    MAPLESYCAMOR <0.10 09/01/2023    OAK <0.10 09/01/2023    BERMUDAGR <0.10 09/01/2023    JOHNSONGR <0.10 09/01/2023    BLUEGRASS <0.10 09/01/2023    TIMOTHYGRASS <0.10 09/01/2023    SWTVERNAL <0.10 09/01/2023     Lab Results   Component Value Date    LAMBQUART <0.10 09/01/2023    PIGWEED <0.10 09/01/2023    COMRAGWEED <0.10 09/01/2023    SHEEPSOR <0.10 09/01/2023    PLANTAIN <0.10 09/01/2023    CATEPI <0.10 09/01/2023    DOGEPI <0.10 09/01/2023    MOUSEEPI <0.10 09/01/2023    ALTERNA <0.10 09/01/2023    CLADHERB <0.10 09/01/2023    ICA04 <0.10 09/01/2023    DERMFAR <0.10 09/01/2023    DERMPTE <0.10 09/01/2023    COCKR <0.10 09/01/2023       ASSESSMENT & PLAN  Ms. Lakia Gomez is a 29 y.o. female with PMH of ARC, moderate persistent asthma, food allergy, and drug allergy, who presents today for a virtual follow-up visit.     1. Moderate persistent asthma without complication  Currently well controlled, with rare symptoms and/or rescue inhaler use.  - Will continue Symbicort  (budesonide/formoterol) 160/4.5 to use 2 puffs twice a day, and can use the same inhaler - 2 extra puffs - if still having symptoms, up to a maximum of 12 puffs per day.  - Discussed with patient/family that if using rescue puffs more than 1-2/x week we should be contacted to assess the need for possible asthma medication adjustment.  - Symbicort 160-4.5 mcg/actuation inhaler; INHALE 2 PUFFS BY MOUTH TWO TIMES A DAY. USE ADDITIONAL 2 PUFFS AS NEEDED, UP TO 12 PUFFS PER DAY  Dispense: 10.2 g; Refill: 4    2. Adverse food reaction  History compatible with IgE-mediated allergy to peanut, tree nuts, and citrus seeds.  - Continue strict avoidance of: peanut, tree nuts, and citrus seeds.  - EPINEPHrine 0.3 mg/0.3 mL injection syringe; Inject 0.3 mL (0.3 mg) into the muscle 1 time if needed for anaphylaxis for up to 6 doses. Follow emergency action plan. Inject into upper leg. Call 911 or go to the ED (whichever gets you medical care faster) after use.  Dispense: 2 each; Refill: 2    3. Seasonal allergic rhinitis due to pollen  Moderate to severe symptoms, now well controlled. Negative environmental testing in 2023, but symptoms respond to medication, so may continue using oral antihistamine and topical agents.  - Reviewed therapeutic regimen possibilities, including topical agents and oral antihistamines, with oral fexofenadine, nasal azelastine and fluticasone sprays prescribed.  - Discussed with patient/family that nasal topical agents need to be used in a consistent way to obtain clinical benefits.  - Discussed avoidance strategies and techniques for relevant allergens, with handouts given to the patient/family.   - azelastine (Astelin) 137 mcg (0.1 %) nasal spray; Administer 1 spray into each nostril 2 times a day. Use in each nostril as directed  Dispense: 30 mL; Refill: 2  - fluticasone (Flonase) 50 mcg/actuation nasal spray; Administer 1 spray into each nostril once daily. Shake gently. Before first use, prime  pump. After use, clean tip and replace cap.  Dispense: 16 g; Refill: 2  - fexofenadine (Allegra) 180 mg tablet; Take 1 tablet (180 mg) by mouth once daily.  Dispense: 90 tablet; Refill: 0     Follow-up visit is recommended in 12 months.    Parminder Zavala MD     This visit was completed via audio and visual technology. All issues as below were discussed and addressed and a limited physical exam within the constraints of the technology was performed. If it was felt that the patient should be evaluated in clinic then they were directed there. Verbal consent was requested and obtained from patient to provide this telehealth service on this date for a telehealth visit.

## 2024-07-12 ENCOUNTER — APPOINTMENT (OUTPATIENT)
Dept: ALLERGY | Facility: CLINIC | Age: 29
End: 2024-07-12
Payer: COMMERCIAL

## 2024-07-12 DIAGNOSIS — J45.40 MODERATE PERSISTENT ASTHMA WITHOUT COMPLICATION (HHS-HCC): Primary | ICD-10-CM

## 2024-07-12 DIAGNOSIS — T78.1XXD ADVERSE FOOD REACTION, SUBSEQUENT ENCOUNTER: ICD-10-CM

## 2024-07-12 DIAGNOSIS — J30.1 SEASONAL ALLERGIC RHINITIS DUE TO POLLEN: ICD-10-CM

## 2024-07-12 PROBLEM — T78.1XXA ADVERSE FOOD REACTION: Status: ACTIVE | Noted: 2024-07-12

## 2024-07-12 PROBLEM — T70.20XA EFFECTS OF HIGH ALTITUDE: Status: ACTIVE | Noted: 2024-07-12

## 2024-07-12 PROBLEM — M25.569 KNEE PAIN: Status: RESOLVED | Noted: 2024-07-12 | Resolved: 2024-07-12

## 2024-07-12 PROBLEM — S83.90XA SPRAIN OF KNEE: Status: RESOLVED | Noted: 2024-07-12 | Resolved: 2024-07-12

## 2024-07-12 PROBLEM — T50.905A ADVERSE EFFECT OF DRUG: Status: RESOLVED | Noted: 2024-07-12 | Resolved: 2024-07-12

## 2024-07-12 PROBLEM — N93.9 ABNORMAL UTERINE BLEEDING: Status: ACTIVE | Noted: 2023-08-10

## 2024-07-12 PROBLEM — H10.10 ALLERGIC CONJUNCTIVITIS: Status: RESOLVED | Noted: 2024-07-12 | Resolved: 2024-07-12

## 2024-07-12 PROCEDURE — 99213 OFFICE O/P EST LOW 20 MIN: CPT | Performed by: STUDENT IN AN ORGANIZED HEALTH CARE EDUCATION/TRAINING PROGRAM

## 2024-07-12 PROCEDURE — RXMED WILLOW AMBULATORY MEDICATION CHARGE

## 2024-07-12 PROCEDURE — 1036F TOBACCO NON-USER: CPT | Performed by: STUDENT IN AN ORGANIZED HEALTH CARE EDUCATION/TRAINING PROGRAM

## 2024-07-12 RX ORDER — FLUTICASONE PROPIONATE 50 MCG
1 SPRAY, SUSPENSION (ML) NASAL DAILY
Qty: 16 G | Refills: 2 | Status: SHIPPED | OUTPATIENT
Start: 2024-07-12 | End: 2024-07-12 | Stop reason: SDUPTHER

## 2024-07-12 RX ORDER — MINERAL OIL
180 ENEMA (ML) RECTAL DAILY
Qty: 30 TABLET | Refills: 2 | Status: SHIPPED | OUTPATIENT
Start: 2024-07-12 | End: 2024-07-12 | Stop reason: SDUPTHER

## 2024-07-12 RX ORDER — AZELASTINE 1 MG/ML
1 SPRAY, METERED NASAL 2 TIMES DAILY
Qty: 30 ML | Refills: 2 | Status: SHIPPED | OUTPATIENT
Start: 2024-07-12 | End: 2024-07-12 | Stop reason: RX

## 2024-07-12 RX ORDER — FLUTICASONE PROPIONATE 50 MCG
1 SPRAY, SUSPENSION (ML) NASAL DAILY
Qty: 16 G | Refills: 2 | Status: SHIPPED | OUTPATIENT
Start: 2024-07-12 | End: 2024-10-10

## 2024-07-12 RX ORDER — KETOTIFEN FUMARATE 0.35 MG/ML
SOLUTION/ DROPS OPHTHALMIC
COMMUNITY
Start: 2023-04-04

## 2024-07-12 RX ORDER — AZELASTINE 1 MG/ML
1 SPRAY, METERED NASAL 2 TIMES DAILY
Qty: 30 ML | Refills: 2 | Status: SHIPPED | OUTPATIENT
Start: 2024-07-12 | End: 2024-10-10

## 2024-07-12 RX ORDER — MINERAL OIL
180 ENEMA (ML) RECTAL DAILY
Qty: 90 TABLET | Refills: 0 | Status: SHIPPED | OUTPATIENT
Start: 2024-07-12 | End: 2024-10-10

## 2024-07-12 RX ORDER — BUDESONIDE AND FORMOTEROL FUMARATE DIHYDRATE 160; 4.5 UG/1; UG/1
AEROSOL RESPIRATORY (INHALATION)
Qty: 10.2 G | Refills: 4 | Status: SHIPPED | OUTPATIENT
Start: 2024-07-12 | End: 2025-07-12

## 2024-07-12 ASSESSMENT — ASTHMA QUESTIONNAIRES: QUESTION_5 LAST FOUR WEEKS HOW WOULD YOU RATE YOUR ASTHMA CONTROL: WELL CONTROLLED

## 2024-07-12 NOTE — PATIENT INSTRUCTIONS
Thank you very much for visiting us today. I sen refills for all your medications and we will plan to see you in 12 months, but please feel free to contact us through our office at [phone] and press [phone] to talk with our  for any scheduling needs or [phone] to talk with our nursing team if you have any earlier or additional clinical needs. It was a pleasure caring for you today!    ==============================    FOOD ALLERGY TESTING AND FREQUENTLY ASKED QUESTIONS    What Causes a Food Allergy?  The job of the body's immune system is to identify and destroy germs (such as bacteria or viruses) that make you sick. A food allergy happens when your immune system overreacts to a harmless food protein-an allergen.  In the [place] the eight most common food allergens are milk, egg, peanut, tree nuts, soy, wheat, fish and shellfish.  Family history appears to play a role in whether someone develops a food allergy. If you have other kinds of allergic reactions, like eczema or hay fever, you have a greater risk of food allergy. This is also true of asthma.  Food allergies are not the same as food intolerances, and food allergy symptoms overlap with symptoms of other medical conditions. It is therefore important to have your food allergy confirmed by an appropriate evaluation with an allergist.    Food Allergies Are Serious  Food allergy may occur in response to any food, and some people are allergic to more than one food. Food allergies may start in childhood or as an adult.  All food allergies have one thing in common: They are potentially life-threatening. Always take food allergies-and the people who live with them-seriously.  Food allergy reactions can vary unpredictably from mild to severe. Mild food allergy reactions may involve only a few hives or minor abdominal pain, though some food allergy reactions progress to severe anaphylaxis with low blood pressure and loss of consciousness.  Currently, there  is no cure for food allergies.    Anaphylaxis  Anaphylaxis (pronounced [id number]-LAX-is) is a severe, potentially life-threatening allergic reaction. Symptoms can affect several areas of the body, including breathing and blood circulation. Anaphylaxis often begins within minutes after a person eats a problem food. Less commonly, symptoms may begin hours later. Up to 20 percent of patients have a second wave of symptoms hours or even days after their initial symptoms have subsided. This is called biphasic anaphylaxis.    How to Use an Epinephrine Auto-Injector  It is critical to know how to use an epinephrine auto-injector and that's the reason why we went over that in detail today!  Patients and their families should know how to respond to a severe reaction. It is normal to be nervous about learning how to properly use the auto-injector. Keep in mind that thousands of people have successfully learned to use these devices, and with practice, you will, too.  Be sure to read the instructions carefully and practice using the training device provided by the . Check out the 's website to see if a training video is available. By making sure you are have all of the information you need and practicing with the training device, you will be well-prepared to use the auto-injector when anaphylaxis occurs. Knowing that you are prepared for an emergency will give you peace of mind. Depending on which type of auto-injector we prescribed (or was covered by your insurance provider), you can find detailed instructions and resources online.     Keep in mind that epinephrine expires after a certain period (usually around one year), so be sure to check the expiration date and renew your prescription in time. Although you may never need to take your medication, it's important to have it available and ready for use at all times. (Allergists generally recommend that if you have an anaphylactic reaction and your  "epinephrine has , you should use the auto-injector anyway and, as always, call 911 for help immediately.    Blood tests  What are the blood tests for? In addition to the skin tests for food allergies, the blood test measures the amount of IgE (allergic antibody) against specific foods or other allergens.  These antibodies play a big part in causing the symptoms of most typical allergic reactions. In general, the higher the test result, the more likely there is an allergy, but the interpretation varies by the food. Different foods have different \"rules.\"  What types of results are possible? The results range from undetectable (<0.35) to over 100 (>100).  Does a \"positive\" test mean my child is definitely allergic? A positive test does not necessarily mean there is an allergy, but it raises suspicion.  Does a \"negative\" test mean my child is not allergic? Negative tests usually, but not always, indicate there is no immediate type of allergy. However, a negative test is a snapshot in time. This is not a lifetime guarantee of no allergy.  Does the test tell severity of an allergy? No, these tests are not good at predicting severity of an allergic reaction. If there is a true allergy, anaphylaxis can occur with low or high values. Severity also varies depending on the type of food.  Also, an increase over time does not necessarily mean an allergy is getting \"worse\" or more severe.   IMPORTANT Please do not make changes to your children's diet based on your own interpretation of these tests. Please call [phone] IF YOU HAVE QUESTIONS or WOULD LIKE TO REVIEW THE RESULTS AND RECOMMENDATIONS.     Feeding tests / Oral food challenges  An oral food challenge is generally offered when there is a good chance the food may be tolerated, but there is a risk of reaction (including anaphylaxis) and so medical supervision is needed. The food is given gradually over about 1-2 hours, looking for any symptoms with each dose. " Feeding is stopped (and medications given, if needed) for any symptoms. The patient is watched closely for several hours after completion, and so at minimum you would stay a half day, possibly longer. The decision to undergo the test typically requires the doctor believing it is reasonable (offering it), and the patient/family feeling that adding the food would be useful (nutritional, social, quality of life, etc). The goal would be to add the food as a routine part of the diet, if possible. You must be healthy (no new illness, no severe rashes or active asthma, etc) and off of antihistamines in preparation for the test. You will be instructed to bring the food (a typical serving and perhaps several different options) and some additional snacks, and diversions. We have television and wireless access for your devices. We will give you additional information if you decide to schedule the oral food challenge.    You can call us with additional questions, and you have great resources available for families of patients with food allergy, including patient advocacy organizations like [employer] (Food [employer]ergy [employer] Education) - foodallergy.org.    ==============================     POLLEN ALLERGY    Pollens are small particles that plants such as trees, grasses, and weeds release into the air. The amount of pollen in the air outdoors varies with the season and the time of day. Pollen and outdoor mold amounts tend to be lower in the early morning and higher at midday and in the afternoon.  Pollens from grasses, weeds, and trees are lightweight and can be carried in the air for miles. These pollens land in the eyes, nose, and airways, worsening allergies and/or asthma. Flower pollens are heavier and are carried from plant to plant by insects rather than the wind. As a result, flower pollens rarely cause allergies. Although it is hard to avoid pollens completely, some suggestions include:  ·Keep your windows shut (especially  in your bedroom), and use central air conditioning during pollen seasons. If a room air conditioner is used, recirculate the indoor air rather than pulling air in from outside. Air purifiers can be helpful if filters are kept clean. HEPA (high efficiency particulate air) filters are best. Wash or change air filters once a month. After being outside during allergy season, you should shower and change clothes right away. Do not keep the dirty clothes in bedrooms because there may be pollen on the clothes.      ==============================

## 2024-07-15 ENCOUNTER — TELEPHONE (OUTPATIENT)
Dept: ALLERGY | Facility: CLINIC | Age: 29
End: 2024-07-15
Payer: COMMERCIAL

## 2024-07-15 ENCOUNTER — PHARMACY VISIT (OUTPATIENT)
Dept: PHARMACY | Facility: CLINIC | Age: 29
End: 2024-07-15
Payer: COMMERCIAL

## 2024-07-15 DIAGNOSIS — J45.40 MODERATE PERSISTENT ASTHMA WITHOUT COMPLICATION (HHS-HCC): ICD-10-CM

## 2024-07-15 RX ORDER — BUDESONIDE AND FORMOTEROL FUMARATE DIHYDRATE 160; 4.5 UG/1; UG/1
AEROSOL RESPIRATORY (INHALATION)
Qty: 10.2 G | Refills: 11 | Status: CANCELLED | OUTPATIENT
Start: 2024-07-15 | End: 2025-07-15

## 2024-07-15 RX ORDER — BUDESONIDE AND FORMOTEROL FUMARATE DIHYDRATE 160; 4.5 UG/1; UG/1
2 AEROSOL RESPIRATORY (INHALATION)
Qty: 10.2 G | Refills: 2 | Status: SHIPPED | OUTPATIENT
Start: 2024-07-15 | End: 2024-10-13

## 2024-07-15 NOTE — TELEPHONE ENCOUNTER
[facility] called to ask for a new script for symbicort without a VERNON checked so they can run it for generic

## 2024-08-02 PROCEDURE — RXMED WILLOW AMBULATORY MEDICATION CHARGE

## 2024-08-06 ENCOUNTER — PHARMACY VISIT (OUTPATIENT)
Dept: PHARMACY | Facility: CLINIC | Age: 29
End: 2024-08-06
Payer: COMMERCIAL

## 2024-08-15 ENCOUNTER — APPOINTMENT (OUTPATIENT)
Dept: PRIMARY CARE | Facility: CLINIC | Age: 29
End: 2024-08-15
Payer: COMMERCIAL

## 2024-08-15 VITALS
DIASTOLIC BLOOD PRESSURE: 68 MMHG | TEMPERATURE: 98 F | SYSTOLIC BLOOD PRESSURE: 100 MMHG | WEIGHT: 186 LBS | HEIGHT: 63 IN | BODY MASS INDEX: 32.96 KG/M2

## 2024-08-15 DIAGNOSIS — Z00.00 ROUTINE ADULT HEALTH MAINTENANCE: Primary | ICD-10-CM

## 2024-08-15 DIAGNOSIS — E78.00 HYPERCHOLESTEROLEMIA: ICD-10-CM

## 2024-08-15 DIAGNOSIS — I10 ESSENTIAL HYPERTENSION: ICD-10-CM

## 2024-08-15 DIAGNOSIS — E53.8 VITAMIN B12 DEFICIENCY: ICD-10-CM

## 2024-08-15 PROCEDURE — 3078F DIAST BP <80 MM HG: CPT | Performed by: PHYSICIAN ASSISTANT

## 2024-08-15 PROCEDURE — 99395 PREV VISIT EST AGE 18-39: CPT | Performed by: PHYSICIAN ASSISTANT

## 2024-08-15 PROCEDURE — 3074F SYST BP LT 130 MM HG: CPT | Performed by: PHYSICIAN ASSISTANT

## 2024-08-15 PROCEDURE — 1036F TOBACCO NON-USER: CPT | Performed by: PHYSICIAN ASSISTANT

## 2024-08-15 PROCEDURE — 3008F BODY MASS INDEX DOCD: CPT | Performed by: PHYSICIAN ASSISTANT

## 2024-08-15 PROCEDURE — RXMED WILLOW AMBULATORY MEDICATION CHARGE

## 2024-08-15 RX ORDER — ROSUVASTATIN CALCIUM 10 MG/1
10 TABLET, COATED ORAL DAILY
Qty: 90 TABLET | Refills: 3 | Status: SHIPPED | OUTPATIENT
Start: 2024-08-15 | End: 2025-08-15

## 2024-08-15 RX ORDER — CYANOCOBALAMIN 1000 UG/ML
INJECTION, SOLUTION INTRAMUSCULAR; SUBCUTANEOUS
Qty: 1 ML | Refills: 11 | Status: SHIPPED | OUTPATIENT
Start: 2024-08-15 | End: 2025-08-15

## 2024-08-15 RX ORDER — VALSARTAN 80 MG/1
80 TABLET ORAL DAILY
Qty: 90 TABLET | Refills: 3 | Status: SHIPPED | OUTPATIENT
Start: 2024-08-15 | End: 2025-08-15

## 2024-08-15 ASSESSMENT — ENCOUNTER SYMPTOMS
DIZZINESS: 0
EYE DISCHARGE: 0
SHORTNESS OF BREATH: 0
SORE THROAT: 0
COLOR CHANGE: 0
CHEST TIGHTNESS: 0
POLYDIPSIA: 0
HEMATURIA: 0
LOSS OF SENSATION IN FEET: 0
NERVOUS/ANXIOUS: 0
NAUSEA: 0
NUMBNESS: 0
FEVER: 0
ABDOMINAL PAIN: 0
APPETITE CHANGE: 0
ARTHRALGIAS: 0
COUGH: 0
TREMORS: 0
EYE PAIN: 0
FATIGUE: 0
WHEEZING: 0
ABDOMINAL DISTENTION: 0
ANAL BLEEDING: 0
FACIAL SWELLING: 0
CHILLS: 0
VOMITING: 0
CONFUSION: 0
HEADACHES: 0
JOINT SWELLING: 0
SLEEP DISTURBANCE: 0
PALPITATIONS: 0
DIFFICULTY URINATING: 0
CHOKING: 0
DEPRESSION: 0
FREQUENCY: 0
CONSTIPATION: 0
MYALGIAS: 0
POLYPHAGIA: 0
WEAKNESS: 0
OCCASIONAL FEELINGS OF UNSTEADINESS: 0
DIARRHEA: 0

## 2024-08-15 ASSESSMENT — PATIENT HEALTH QUESTIONNAIRE - PHQ9
2. FEELING DOWN, DEPRESSED OR HOPELESS: NOT AT ALL
1. LITTLE INTEREST OR PLEASURE IN DOING THINGS: NOT AT ALL
SUM OF ALL RESPONSES TO PHQ9 QUESTIONS 1 AND 2: 0

## 2024-08-15 ASSESSMENT — PAIN SCALES - GENERAL: PAINLEVEL: 0-NO PAIN

## 2024-08-15 NOTE — PROGRESS NOTES
"Subjective   Patient ID: Lakia Gomez is a 29 y.o. female with a history of asthma, hypertension, and hyperlipidemia who presents for Annual Exam.    HPI the patient is presented for physical exam. She does not voice any minor major complaints.  Her asthma is stable.  She denies wheezing or shortness of breath.  She exercises 4-5 times a week by spinning a bike and HIIT workout.  Her blood pressure is well-controlled.  She was started on spironolactone for acne which is well-controlled.    Review of Systems   Constitutional:  Negative for appetite change, chills, fatigue and fever.   HENT:  Negative for congestion, ear pain, facial swelling, hearing loss, nosebleeds and sore throat.    Eyes:  Negative for pain, discharge and visual disturbance.   Respiratory:  Negative for cough, choking, chest tightness, shortness of breath and wheezing.    Cardiovascular:  Negative for chest pain, palpitations and leg swelling.   Gastrointestinal:  Negative for abdominal distention, abdominal pain, anal bleeding, constipation, diarrhea, nausea and vomiting.   Endocrine: Negative for cold intolerance, heat intolerance, polydipsia, polyphagia and polyuria.   Genitourinary:  Negative for difficulty urinating, frequency, hematuria and urgency.   Musculoskeletal:  Negative for arthralgias, gait problem, joint swelling and myalgias.   Skin:  Negative for color change and rash.   Neurological:  Negative for dizziness, tremors, syncope, weakness, numbness and headaches.   Psychiatric/Behavioral:  Negative for behavioral problems, confusion, sleep disturbance and suicidal ideas. The patient is not nervous/anxious.        Objective   /68   Temp 36.7 °C (98 °F) (Temporal)   Ht 1.6 m (5' 3\")   Wt 84.4 kg (186 lb)   BMI 32.95 kg/m²     Physical Exam  Constitutional:       General: She is not in acute distress.     Appearance: Normal appearance.   HENT:      Head: Normocephalic and atraumatic.      Nose: Nose normal.   Eyes:      " Extraocular Movements: Extraocular movements intact.      Conjunctiva/sclera: Conjunctivae normal.      Pupils: Pupils are equal, round, and reactive to light.   Cardiovascular:      Rate and Rhythm: Normal rate and regular rhythm.      Pulses: Normal pulses.      Heart sounds: Normal heart sounds.   Pulmonary:      Effort: Pulmonary effort is normal.      Breath sounds: Normal breath sounds.   Abdominal:      General: Bowel sounds are normal.      Palpations: Abdomen is soft.   Musculoskeletal:         General: Normal range of motion.      Cervical back: Normal range of motion and neck supple.   Neurological:      General: No focal deficit present.      Mental Status: She is alert and oriented to person, place, and time.   Psychiatric:         Mood and Affect: Mood normal.         Behavior: Behavior normal.         Thought Content: Thought content normal.         Judgment: Judgment normal.         Assessment/Plan   Complete physical exam.  We obtained fasting blood work including CBC, CMP, lipid panel TSH and vitamin B12.  Continue to exercise exercise at least 30 minutes daily  Follow-up with gynecology   Pap smear 2021  Follow-up with dentist twice year  Follow-up with dermatology yearly for annual exam    Nausea during menstrual periods  Take Zofran as needed    Asthma  Stable  Continue Symbicort daily  Albuterol inhaler and nebulizer as needed  Follow up with allergy     Sweaty hands  Improved  Continue Drysol as needed    Acne  Continue Spironalactone 120 mg daily   Follow-up with dermatology    Vitamin D deficiency  Continue vitamin D 1000 units daily    Motion sickness  The patient is going on a cruise in September  Scopolamine patches are prescribed     Hypertension.  Well-controlled  Continue valsartan 80 mg daily  No added salt diet, do not add salt to your food  Continue to exercise 4-5 times a week    Hyperlipidemia  Continue rosuvastatin 10 mg at bedtime  Continue with the low fat, low cholesterol  diet  I recommend Mediterranean diet, which include fish, chicken, vegetables and olive oil  Continue to exercise 4-5 times a week     Vitamin B 12 deficiency  Continue vitamin B12 injections monthly    Body mass index is 32.95 kg/m².  Lost almost 20 pounds   low fat, low cholesterol diet, low carbohydrate diet  Continue to exercise 4-5 times a week  by HIIT workout and cycling    Follow-up in 6 months or sooner if needed

## 2024-08-17 ENCOUNTER — PHARMACY VISIT (OUTPATIENT)
Dept: PHARMACY | Facility: CLINIC | Age: 29
End: 2024-08-17
Payer: COMMERCIAL

## 2024-09-09 PROCEDURE — RXMED WILLOW AMBULATORY MEDICATION CHARGE

## 2024-09-10 PROCEDURE — RXMED WILLOW AMBULATORY MEDICATION CHARGE

## 2024-09-11 ENCOUNTER — PHARMACY VISIT (OUTPATIENT)
Dept: PHARMACY | Facility: CLINIC | Age: 29
End: 2024-09-11
Payer: COMMERCIAL

## 2024-09-16 ENCOUNTER — PHARMACY VISIT (OUTPATIENT)
Dept: PHARMACY | Facility: CLINIC | Age: 29
End: 2024-09-16
Payer: COMMERCIAL

## 2024-09-16 PROCEDURE — RXMED WILLOW AMBULATORY MEDICATION CHARGE

## 2024-09-18 ENCOUNTER — PHARMACY VISIT (OUTPATIENT)
Dept: PHARMACY | Facility: CLINIC | Age: 29
End: 2024-09-18
Payer: COMMERCIAL

## 2024-10-07 DIAGNOSIS — J30.1 SEASONAL ALLERGIC RHINITIS DUE TO POLLEN: ICD-10-CM

## 2024-10-07 RX ORDER — MINERAL OIL
180 ENEMA (ML) RECTAL DAILY
Qty: 90 TABLET | Refills: 0 | Status: SHIPPED | OUTPATIENT
Start: 2024-10-07 | End: 2025-01-08

## 2024-10-09 PROCEDURE — RXMED WILLOW AMBULATORY MEDICATION CHARGE

## 2024-10-10 ENCOUNTER — PHARMACY VISIT (OUTPATIENT)
Dept: PHARMACY | Facility: CLINIC | Age: 29
End: 2024-10-10
Payer: COMMERCIAL

## 2024-10-10 PROCEDURE — RXMED WILLOW AMBULATORY MEDICATION CHARGE

## 2024-10-14 ENCOUNTER — PHARMACY VISIT (OUTPATIENT)
Dept: PHARMACY | Facility: CLINIC | Age: 29
End: 2024-10-14
Payer: COMMERCIAL

## 2024-10-14 PROCEDURE — RXMED WILLOW AMBULATORY MEDICATION CHARGE

## 2024-10-25 ENCOUNTER — APPOINTMENT (OUTPATIENT)
Dept: SLEEP MEDICINE | Facility: CLINIC | Age: 29
End: 2024-10-25
Payer: COMMERCIAL

## 2024-10-25 DIAGNOSIS — Z30.44 ENCOUNTER FOR SURVEILLANCE OF VAGINAL RING HORMONAL CONTRACEPTIVE DEVICE: ICD-10-CM

## 2024-10-25 PROCEDURE — RXMED WILLOW AMBULATORY MEDICATION CHARGE

## 2024-10-25 RX ORDER — ETONOGESTREL AND ETHINYL ESTRADIOL VAGINAL RING .015; .12 MG/D; MG/D
1 RING VAGINAL
Qty: 3 EACH | Refills: 0 | Status: SHIPPED | OUTPATIENT
Start: 2024-10-25

## 2024-10-28 ENCOUNTER — E-VISIT (OUTPATIENT)
Dept: PRIMARY CARE | Facility: CLINIC | Age: 29
End: 2024-10-28
Payer: COMMERCIAL

## 2024-10-28 DIAGNOSIS — J01.40 ACUTE NON-RECURRENT PANSINUSITIS: Primary | ICD-10-CM

## 2024-10-28 RX ORDER — AMOXICILLIN AND CLAVULANATE POTASSIUM 875; 125 MG/1; MG/1
875 TABLET, FILM COATED ORAL 2 TIMES DAILY
Qty: 14 TABLET | Refills: 0 | Status: SHIPPED | OUTPATIENT
Start: 2024-10-28 | End: 2024-11-04

## 2024-10-28 ASSESSMENT — LIFESTYLE VARIABLES: HISTORY_OF_SMOKING: I HAVE NEVER SMOKED

## 2024-10-29 ENCOUNTER — PHARMACY VISIT (OUTPATIENT)
Dept: PHARMACY | Facility: CLINIC | Age: 29
End: 2024-10-29
Payer: COMMERCIAL

## 2024-11-06 PROCEDURE — RXMED WILLOW AMBULATORY MEDICATION CHARGE

## 2024-11-08 PROCEDURE — RXMED WILLOW AMBULATORY MEDICATION CHARGE

## 2024-11-12 ENCOUNTER — OFFICE VISIT (OUTPATIENT)
Dept: PRIMARY CARE | Facility: CLINIC | Age: 29
End: 2024-11-12
Payer: COMMERCIAL

## 2024-11-12 VITALS
TEMPERATURE: 97.3 F | WEIGHT: 194 LBS | HEIGHT: 63 IN | SYSTOLIC BLOOD PRESSURE: 112 MMHG | BODY MASS INDEX: 34.38 KG/M2 | DIASTOLIC BLOOD PRESSURE: 78 MMHG

## 2024-11-12 DIAGNOSIS — H69.93 DYSFUNCTION OF BOTH EUSTACHIAN TUBES: Primary | ICD-10-CM

## 2024-11-12 PROCEDURE — 99213 OFFICE O/P EST LOW 20 MIN: CPT | Performed by: PHYSICIAN ASSISTANT

## 2024-11-12 PROCEDURE — 1036F TOBACCO NON-USER: CPT | Performed by: PHYSICIAN ASSISTANT

## 2024-11-12 PROCEDURE — 3074F SYST BP LT 130 MM HG: CPT | Performed by: PHYSICIAN ASSISTANT

## 2024-11-12 PROCEDURE — 3078F DIAST BP <80 MM HG: CPT | Performed by: PHYSICIAN ASSISTANT

## 2024-11-12 PROCEDURE — 3008F BODY MASS INDEX DOCD: CPT | Performed by: PHYSICIAN ASSISTANT

## 2024-11-12 RX ORDER — METHYLPREDNISOLONE 4 MG/1
TABLET ORAL
Qty: 21 TABLET | Refills: 0 | Status: SHIPPED | OUTPATIENT
Start: 2024-11-12 | End: 2024-11-19

## 2024-11-12 ASSESSMENT — ENCOUNTER SYMPTOMS
WHEEZING: 0
CHEST TIGHTNESS: 0
ANAL BLEEDING: 0
FATIGUE: 0
COUGH: 0
ARTHRALGIAS: 0
DIZZINESS: 0
EYE PAIN: 0
WEAKNESS: 0
POLYDIPSIA: 0
POLYPHAGIA: 0
NERVOUS/ANXIOUS: 0
DIARRHEA: 0
COLOR CHANGE: 0
NAUSEA: 0
CHOKING: 0
SLEEP DISTURBANCE: 0
CHILLS: 0
JOINT SWELLING: 0
HEMATURIA: 0
MYALGIAS: 0
PALPITATIONS: 0
ABDOMINAL DISTENTION: 0
FEVER: 0
SORE THROAT: 0
RHINORRHEA: 0
FREQUENCY: 0
TREMORS: 0
NECK PAIN: 0
SHORTNESS OF BREATH: 0
VOMITING: 0
ABDOMINAL PAIN: 0
HEADACHES: 0
FACIAL SWELLING: 0
EYE DISCHARGE: 0
DIFFICULTY URINATING: 0
APPETITE CHANGE: 0
CONFUSION: 0
CONSTIPATION: 0
NUMBNESS: 0

## 2024-11-12 ASSESSMENT — COLUMBIA-SUICIDE SEVERITY RATING SCALE - C-SSRS
6. HAVE YOU EVER DONE ANYTHING, STARTED TO DO ANYTHING, OR PREPARED TO DO ANYTHING TO END YOUR LIFE?: NO
1. IN THE PAST MONTH, HAVE YOU WISHED YOU WERE DEAD OR WISHED YOU COULD GO TO SLEEP AND NOT WAKE UP?: NO
2. HAVE YOU ACTUALLY HAD ANY THOUGHTS OF KILLING YOURSELF?: NO

## 2024-11-12 ASSESSMENT — PAIN SCALES - GENERAL: PAINLEVEL_OUTOF10: 4

## 2024-11-12 NOTE — PROGRESS NOTES
Subjective   Patient ID: Lakia Gomez is a 29 y.o. female with a history of asthma, hypertension, and hyperlipidemia who presents for Earache (L/R/Onset:  approx. 2 weeks/Tx: Augmentin  BID x7 days).    Earache   There is pain in both ears. This is a recurrent problem. The current episode started more than 1 month ago. The problem occurs constantly. The problem has been gradually worsening. There has been no fever. The pain is at a severity of 4/10. Pertinent negatives include no abdominal pain, coughing, diarrhea, ear discharge, headaches, hearing loss, neck pain, rash, rhinorrhea, sore throat or vomiting.    the patient is presented with complaints of ear aches, ear fullness and pressure bilaterally. She was started on Augmentin on 10/20/2024 for sinusitis which somewhat relieved her symptoms.  She denies fever, chills, cough, sore throat, sinus pressure or tenderness.    Review of Systems   Constitutional:  Negative for appetite change, chills, fatigue and fever.   HENT:  Negative for congestion, ear discharge, ear pain, facial swelling, hearing loss, nosebleeds, rhinorrhea and sore throat.         Ear fullness and pressure b/l   Eyes:  Negative for pain, discharge and visual disturbance.   Respiratory:  Negative for cough, choking, chest tightness, shortness of breath and wheezing.    Cardiovascular:  Negative for chest pain, palpitations and leg swelling.   Gastrointestinal:  Negative for abdominal distention, abdominal pain, anal bleeding, constipation, diarrhea, nausea and vomiting.   Endocrine: Negative for cold intolerance, heat intolerance, polydipsia, polyphagia and polyuria.   Genitourinary:  Negative for difficulty urinating, frequency, hematuria and urgency.   Musculoskeletal:  Negative for arthralgias, gait problem, joint swelling, myalgias and neck pain.   Skin:  Negative for color change and rash.   Neurological:  Negative for dizziness, tremors, syncope, weakness, numbness and headaches.  "  Psychiatric/Behavioral:  Negative for behavioral problems, confusion, sleep disturbance and suicidal ideas. The patient is not nervous/anxious.        Objective   /78 (Patient Position: Sitting)   Temp 36.3 °C (97.3 °F) (Temporal)   Ht 1.6 m (5' 3\")   Wt 88 kg (194 lb)   LMP 10/18/2024   Breastfeeding No   BMI 34.37 kg/m²     Physical Exam  Constitutional:       General: She is not in acute distress.     Appearance: Normal appearance.   HENT:      Head: Normocephalic and atraumatic.      Nose: Nose normal.   Eyes:      Extraocular Movements: Extraocular movements intact.      Conjunctiva/sclera: Conjunctivae normal.      Pupils: Pupils are equal, round, and reactive to light.   Cardiovascular:      Rate and Rhythm: Normal rate and regular rhythm.      Pulses: Normal pulses.      Heart sounds: Normal heart sounds.   Pulmonary:      Effort: Pulmonary effort is normal.      Breath sounds: Normal breath sounds.   Abdominal:      General: Bowel sounds are normal.      Palpations: Abdomen is soft.   Musculoskeletal:         General: Normal range of motion.      Cervical back: Normal range of motion and neck supple.   Neurological:      General: No focal deficit present.      Mental Status: She is alert and oriented to person, place, and time.   Psychiatric:         Mood and Affect: Mood normal.         Behavior: Behavior normal.         Thought Content: Thought content normal.         Judgment: Judgment normal.         Assessment/Plan   Ear fullness and pressure  Most likely due to eustachian tube dysfunction  Start Medrol Dosepak  The patient has itchiness from prednisone.  She was advised to stop Medrol Dosepak immediately if she develops itchiness  Continue fluticasone nasal spray    Nausea due to menstrual periods  Take Zofran as needed    Asthma  Stable  Continue Symbicort daily  Albuterol inhaler and nebulizer as needed  Follow up with allergy     Sweaty hands  Improved  Continue Drysol as " needed    Acne  Continue Spironalactone 120 mg daily   Follow-up with dermatology    Vitamin D deficiency  Continue vitamin D 1000 units daily    Motion sickness  The patient is going on a cruise in September  Scopolamine patches are prescribed     Hypertension.  Well-controlled  Continue valsartan 80 mg daily  No added salt diet, do not add salt to your food  Continue to exercise 4-5 times a week    Hyperlipidemia  Continue rosuvastatin 10 mg at bedtime  Continue with the low fat, low cholesterol diet  I recommend Mediterranean diet, which include fish, chicken, vegetables and olive oil  Continue to exercise 4-5 times a week     Vitamin B 12 deficiency  Continue vitamin B12 injections monthly    Body mass index is 34.37 kg/m².  Lost almost 20 pounds   low fat, low cholesterol diet, low carbohydrate diet  Continue to exercise 4-5 times a week by HIIT workout and cycling    Health maintenance   Continue to exercise exercise at least 30 minutes daily  Follow-up with gynecology   Pap smear 2021  Follow-up with dentist twice year  Follow-up with dermatology yearly for annual exam    Follow-up in 6 months or sooner if needed

## 2024-11-14 ENCOUNTER — PHARMACY VISIT (OUTPATIENT)
Dept: PHARMACY | Facility: CLINIC | Age: 29
End: 2024-11-14
Payer: COMMERCIAL

## 2024-12-04 PROCEDURE — RXMED WILLOW AMBULATORY MEDICATION CHARGE

## 2024-12-05 DIAGNOSIS — Z11.59 NEED FOR HEPATITIS B SCREENING TEST: Primary | ICD-10-CM

## 2024-12-06 ENCOUNTER — PHARMACY VISIT (OUTPATIENT)
Dept: PHARMACY | Facility: CLINIC | Age: 29
End: 2024-12-06
Payer: COMMERCIAL

## 2024-12-07 PROCEDURE — RXMED WILLOW AMBULATORY MEDICATION CHARGE

## 2024-12-09 PROCEDURE — RXMED WILLOW AMBULATORY MEDICATION CHARGE

## 2024-12-10 PROCEDURE — RXMED WILLOW AMBULATORY MEDICATION CHARGE

## 2024-12-11 ENCOUNTER — APPOINTMENT (OUTPATIENT)
Dept: OBSTETRICS AND GYNECOLOGY | Facility: CLINIC | Age: 29
End: 2024-12-11
Payer: COMMERCIAL

## 2024-12-11 VITALS
DIASTOLIC BLOOD PRESSURE: 70 MMHG | HEIGHT: 63 IN | SYSTOLIC BLOOD PRESSURE: 110 MMHG | WEIGHT: 194 LBS | BODY MASS INDEX: 34.38 KG/M2

## 2024-12-11 DIAGNOSIS — Z01.419 WELL WOMAN EXAM WITH ROUTINE GYNECOLOGICAL EXAM: Primary | ICD-10-CM

## 2024-12-11 DIAGNOSIS — Z30.44 ENCOUNTER FOR SURVEILLANCE OF VAGINAL RING HORMONAL CONTRACEPTIVE DEVICE: ICD-10-CM

## 2024-12-11 PROCEDURE — 3008F BODY MASS INDEX DOCD: CPT | Performed by: OBSTETRICS & GYNECOLOGY

## 2024-12-11 PROCEDURE — 3074F SYST BP LT 130 MM HG: CPT | Performed by: OBSTETRICS & GYNECOLOGY

## 2024-12-11 PROCEDURE — 99395 PREV VISIT EST AGE 18-39: CPT | Performed by: OBSTETRICS & GYNECOLOGY

## 2024-12-11 PROCEDURE — 1036F TOBACCO NON-USER: CPT | Performed by: OBSTETRICS & GYNECOLOGY

## 2024-12-11 PROCEDURE — 3078F DIAST BP <80 MM HG: CPT | Performed by: OBSTETRICS & GYNECOLOGY

## 2024-12-11 RX ORDER — ETONOGESTREL AND ETHINYL ESTRADIOL VAGINAL RING .015; .12 MG/D; MG/D
1 RING VAGINAL
Qty: 3 EACH | Refills: 4 | Status: SHIPPED | OUTPATIENT
Start: 2024-12-11

## 2024-12-11 NOTE — PROGRESS NOTES
Chief Complaint   Patient presents with    Annual Exam     Annual Exam No Pap Smear    G0  Prefers pap q3 years    No complaints.  Request refill of Nuva Ring.    Chaperone declined.       For annual exam and refills of NuvaRing.  Has no concerns.  Has been utilizing red light therapy and this has been helpful for dysmenorrhea and menorrhagia.  Has this done at chiropractor    REVIEW OF SYSTEMS    Please see HPI for reported pertinent positives, which would supersede this ROS    Constitutional:  Denies fever, chills, wt gain or loss, fatigue    Genito-Urinary:  Denies genital lesion or sores, vaginal dryness, itching  or pain.  No abnormal vaginal discharge or unexplained vaginal bleeding.  No dysuria, urinary incontinence or frequency.  Denies pelvic pain, dysmenorrhea or dyspareunia.    Eyes:  Denies vision changes, dryness  ENT:  No hearing loss, sinus pain or congestion, nosebleeds  Cardiovascular:  No chest pain or palpitations  Respiratory:  No SOB, cough, wheezing  GI:  No Nausea, vomiting, diarrhea, constipation, abdominal pain  Musculoskeletal:  No new back pain. joint pain, peripheral edema  Skin:  No rash or skin lesion  Neurologic:  No HA, numbness or dizziness  Psychiatric:  No new anxiety or depression  Endocrine:  No loss of hair or hirsutism  Hematologic/lymphatic:  No swollen lymph nodes.  No reported tendency for easy bruising or bleeding    Patient admits to no other systemic complaints      Vitals:    12/11/24 0801   BP: 110/70       PHYSICAL EXAM:    PSYCH:  Pt is alert, oriented and cooperative    SKIN: warm, dry, w/o lesion    HEAD AND FACE:  External inspection of eyes, ears, functional cranial nerves normal and intact    THYROID:  No thyromegaly    CARDIOVASCULAR:  Warm and well Perfused    PULMONARY:  No respiratory distress    ABDOMEN:  soft, nontender.  No mass or organomegaly.      BREAST:  Breasts are symmetric to inspection and palpation.  No mass palpable bilaterally.  There is no  axillary lymphadenopathy    PELVIC:    External genitalia, urethra, perianal region normal to inspection and palpation if indicated.  No inguinal LA    Vagina without lesions.    Cervix seen and visually normal      Bimanual exam:      No pelvic mass palpable    Uterus nontender, midline in pelvis    No adnexal masses or tenderness  Assessment/Plan    Diagnoses and all orders for this visit:  Well woman exam with routine gynecological exam  Encounter for surveillance of vaginal ring hormonal contraceptive device  -     etonogestreL-ethinyl estradioL (NuvaRing) 0.12-0.015 mg/24 hr vaginal ring; Insert 1 ring into the vagina every 28 (twenty-eight) days as directed

## 2024-12-12 ENCOUNTER — PHARMACY VISIT (OUTPATIENT)
Dept: PHARMACY | Facility: CLINIC | Age: 29
End: 2024-12-12
Payer: COMMERCIAL

## 2024-12-30 DIAGNOSIS — Z30.44 ENCOUNTER FOR SURVEILLANCE OF VAGINAL RING HORMONAL CONTRACEPTIVE DEVICE: ICD-10-CM

## 2025-01-02 ENCOUNTER — LAB (OUTPATIENT)
Dept: LAB | Facility: LAB | Age: 30
End: 2025-01-02
Payer: COMMERCIAL

## 2025-01-02 DIAGNOSIS — Z11.59 NEED FOR HEPATITIS B SCREENING TEST: ICD-10-CM

## 2025-01-02 LAB — HBV SURFACE AB SER-ACNC: 6.1 MIU/ML

## 2025-01-02 PROCEDURE — 86706 HEP B SURFACE ANTIBODY: CPT

## 2025-01-02 PROCEDURE — RXMED WILLOW AMBULATORY MEDICATION CHARGE

## 2025-01-02 RX ORDER — ONDANSETRON 4 MG/1
TABLET, ORALLY DISINTEGRATING ORAL
Qty: 30 TABLET | Refills: 6 | Status: SHIPPED | OUTPATIENT
Start: 2025-01-02

## 2025-01-03 ENCOUNTER — PHARMACY VISIT (OUTPATIENT)
Dept: PHARMACY | Facility: CLINIC | Age: 30
End: 2025-01-03
Payer: COMMERCIAL

## 2025-01-04 DIAGNOSIS — J30.1 SEASONAL ALLERGIC RHINITIS DUE TO POLLEN: ICD-10-CM

## 2025-01-06 ENCOUNTER — TELEPHONE (OUTPATIENT)
Dept: PRIMARY CARE | Facility: CLINIC | Age: 30
End: 2025-01-06
Payer: COMMERCIAL

## 2025-01-06 PROCEDURE — RXMED WILLOW AMBULATORY MEDICATION CHARGE

## 2025-01-06 RX ORDER — MINERAL OIL
180 ENEMA (ML) RECTAL DAILY
Qty: 90 TABLET | Refills: 0 | Status: SHIPPED | OUTPATIENT
Start: 2025-01-06 | End: 2025-04-06

## 2025-01-06 NOTE — TELEPHONE ENCOUNTER
----- Message from Johanna Bullard sent at 1/6/2025 12:58 PM EST -----  Please call her.  She is negative for hepatitis B.  If she is interested in vaccine, she needs to have it done at [facility].

## 2025-01-08 ENCOUNTER — PHARMACY VISIT (OUTPATIENT)
Dept: PHARMACY | Facility: CLINIC | Age: 30
End: 2025-01-08
Payer: COMMERCIAL

## 2025-01-08 PROCEDURE — RXMED WILLOW AMBULATORY MEDICATION CHARGE

## 2025-01-09 ENCOUNTER — PHARMACY VISIT (OUTPATIENT)
Dept: PHARMACY | Facility: CLINIC | Age: 30
End: 2025-01-09
Payer: COMMERCIAL

## 2025-01-17 PROCEDURE — RXMED WILLOW AMBULATORY MEDICATION CHARGE

## 2025-01-23 ENCOUNTER — PHARMACY VISIT (OUTPATIENT)
Dept: PHARMACY | Facility: CLINIC | Age: 30
End: 2025-01-23
Payer: COMMERCIAL

## 2025-02-05 PROCEDURE — RXMED WILLOW AMBULATORY MEDICATION CHARGE

## 2025-02-06 PROCEDURE — RXMED WILLOW AMBULATORY MEDICATION CHARGE

## 2025-02-07 ENCOUNTER — PHARMACY VISIT (OUTPATIENT)
Dept: PHARMACY | Facility: CLINIC | Age: 30
End: 2025-02-07
Payer: COMMERCIAL

## 2025-02-25 ENCOUNTER — APPOINTMENT (OUTPATIENT)
Dept: PRIMARY CARE | Facility: CLINIC | Age: 30
End: 2025-02-25
Payer: COMMERCIAL

## 2025-03-04 PROCEDURE — RXMED WILLOW AMBULATORY MEDICATION CHARGE

## 2025-03-05 PROCEDURE — RXMED WILLOW AMBULATORY MEDICATION CHARGE

## 2025-03-10 ENCOUNTER — PHARMACY VISIT (OUTPATIENT)
Dept: PHARMACY | Facility: CLINIC | Age: 30
End: 2025-03-10
Payer: COMMERCIAL

## 2025-03-10 PROCEDURE — RXMED WILLOW AMBULATORY MEDICATION CHARGE

## 2025-03-14 ENCOUNTER — PHARMACY VISIT (OUTPATIENT)
Dept: PHARMACY | Facility: CLINIC | Age: 30
End: 2025-03-14
Payer: COMMERCIAL

## 2025-03-20 ENCOUNTER — E-VISIT (OUTPATIENT)
Dept: PRIMARY CARE | Facility: CLINIC | Age: 30
End: 2025-03-20
Payer: COMMERCIAL

## 2025-03-20 ENCOUNTER — PHARMACY VISIT (OUTPATIENT)
Dept: PHARMACY | Facility: CLINIC | Age: 30
End: 2025-03-20
Payer: COMMERCIAL

## 2025-03-20 DIAGNOSIS — R09.3 ABNORMAL COLOR OF SPUTUM: ICD-10-CM

## 2025-03-20 DIAGNOSIS — R09.3 ABNORMAL SPUTUM COLOR: ICD-10-CM

## 2025-03-20 DIAGNOSIS — J06.9 PROTRACTED URI: Primary | ICD-10-CM

## 2025-03-20 PROCEDURE — RXMED WILLOW AMBULATORY MEDICATION CHARGE

## 2025-03-20 RX ORDER — AMOXICILLIN AND CLAVULANATE POTASSIUM 875; 125 MG/1; MG/1
1 TABLET, FILM COATED ORAL 2 TIMES DAILY
Qty: 14 TABLET | Refills: 0 | Status: SHIPPED | OUTPATIENT
Start: 2025-03-20 | End: 2025-03-27

## 2025-03-20 ASSESSMENT — LIFESTYLE VARIABLES: HISTORY_OF_SMOKING: I HAVE NEVER SMOKED

## 2025-04-10 PROCEDURE — RXMED WILLOW AMBULATORY MEDICATION CHARGE

## 2025-04-15 ENCOUNTER — PHARMACY VISIT (OUTPATIENT)
Dept: PHARMACY | Facility: CLINIC | Age: 30
End: 2025-04-15
Payer: COMMERCIAL

## 2025-06-02 DIAGNOSIS — E53.8 VITAMIN B12 DEFICIENCY: ICD-10-CM

## 2025-06-02 PROCEDURE — RXMED WILLOW AMBULATORY MEDICATION CHARGE

## 2025-06-02 RX ORDER — CYANOCOBALAMIN 1000 UG/ML
INJECTION, SOLUTION INTRAMUSCULAR; SUBCUTANEOUS
Qty: 1 ML | Refills: 11 | Status: SHIPPED | OUTPATIENT
Start: 2025-06-02 | End: 2026-06-02

## 2025-06-03 ENCOUNTER — APPOINTMENT (OUTPATIENT)
Dept: DERMATOLOGY | Facility: CLINIC | Age: 30
End: 2025-06-03
Payer: COMMERCIAL

## 2025-06-03 ENCOUNTER — PHARMACY VISIT (OUTPATIENT)
Dept: PHARMACY | Facility: CLINIC | Age: 30
End: 2025-06-03
Payer: COMMERCIAL

## 2025-06-06 ENCOUNTER — PHARMACY VISIT (OUTPATIENT)
Dept: PHARMACY | Facility: CLINIC | Age: 30
End: 2025-06-06
Payer: COMMERCIAL

## 2025-06-09 PROCEDURE — RXMED WILLOW AMBULATORY MEDICATION CHARGE

## 2025-06-10 ENCOUNTER — APPOINTMENT (OUTPATIENT)
Dept: DERMATOLOGY | Facility: CLINIC | Age: 30
End: 2025-06-10
Payer: COMMERCIAL

## 2025-06-10 ENCOUNTER — PHARMACY VISIT (OUTPATIENT)
Dept: PHARMACY | Facility: CLINIC | Age: 30
End: 2025-06-10
Payer: COMMERCIAL

## 2025-06-10 DIAGNOSIS — Z12.83 SCREENING EXAM FOR SKIN CANCER: ICD-10-CM

## 2025-06-10 DIAGNOSIS — D18.01 HEMANGIOMA OF SKIN: ICD-10-CM

## 2025-06-10 DIAGNOSIS — L81.4 LENTIGO: ICD-10-CM

## 2025-06-10 DIAGNOSIS — L74.519 PRIMARY FOCAL HYPERHIDROSIS: ICD-10-CM

## 2025-06-10 DIAGNOSIS — D22.9 MULTIPLE BENIGN NEVI: ICD-10-CM

## 2025-06-10 DIAGNOSIS — L70.0 ACNE VULGARIS: Primary | ICD-10-CM

## 2025-06-10 PROCEDURE — 99214 OFFICE O/P EST MOD 30 MIN: CPT | Performed by: DERMATOLOGY

## 2025-06-10 NOTE — Clinical Note
-Patient notes increased sweating on her bilateral hands.   -Improved with aluminum chloride.   -Continue aluminum chloride 20% solution with improvement. Refills sent.

## 2025-06-10 NOTE — Clinical Note
Acne vulgaris - hormonal acne improved with current regimen   -Explained that the etiology of acne is multifactorial.    -S/p Tretinoin 0.025% cream, adapalene, and benzoyl peroxide 5% wash due to dryness and irritation.  -Continue use of clindamycin 1% lotion to active lesions on face, chest, and back twice daily. Refills sent.   -Continue use of  spironolactone 125mg daily. We reviewed side effects including lightheadedness/dizziness, hyperkalemia, birth defects, breast tenderness, and spotting. Refills sent.

## 2025-06-10 NOTE — PROGRESS NOTES
Subjective     Lakia Gomez is a 30 y.o. female who presents for the following: Acne (Spironolactone 125 mg ) and Skin Check.     Patient last seen 7/2/24. Returns to clinic today for FBSE, hormonal acne, and hyperhidrosis of bilateral hands .     Patient is on spironolactone 125mg daily and clindamycin for her acne. She still developing 1-2 acne lesions around her menstrual cycle which clear up quickly. She is using clindamycin 1% lotion as needed for active lesions. She is very happy with her current regimen. Denies any lightheadedness, dizziness, menstrual irregularities, breast tenderness, or increased urination.     Patient also using aluminum chloride 20% solution to bilateral hands with reduced perspiration.     She is also requesting FBSE. Denies personal history of skin cancer.       Review of Systems:  No other skin or systemic complaints other than what is documented elsewhere in the note.    The following portions of the chart were reviewed this encounter and updated as appropriate:         Skin Cancer History  Biopsy Log Book  No skin cancers from Specimen Tracking.    Additional History      Specialty Problems          Dermatology Problems    Acne vulgaris    Melanocytic nevi of scalp and neck    Melanocytic nevi of trunk    Melanocytic nevi of unspecified lower limb, including hip    Melanocytic nevi of unspecified upper limb, including shoulder    Other melanin hyperpigmentation        Objective   Well appearing patient in no apparent distress; mood and affect are within normal limits.    A full examination was performed including scalp, head, eyes, ears, nose, lips, neck, chest, axillae, abdomen, back, buttocks, bilateral upper extremities, bilateral lower extremities, hands, feet, fingers, toes, fingernails, and toenails. All findings within normal limits unless otherwise noted below.    Assessment/Plan   Skin Exam  1. ACNE VULGARIS  Head - Anterior (Face)  Skin is clear.   Acne vulgaris -  hormonal acne improved with current regimen   -Explained that the etiology of acne is multifactorial.    -S/p Tretinoin 0.025% cream, adapalene, and benzoyl peroxide 5% wash due to dryness and irritation.  -Continue use of clindamycin 1% lotion to active lesions on face, chest, and back twice daily. Refills sent.   -Continue use of  spironolactone 125mg daily. We reviewed side effects including lightheadedness/dizziness, hyperkalemia, birth defects, breast tenderness, and spotting. Refills sent.   Related Medications  spironolactone (Aldactone) 25 mg tablet  Take 1 tablet (25 mg) by mouth once daily.  spironolactone (Aldactone) 100 mg tablet  Take 1 tablet (100 mg) by mouth once daily.  clindamycin (Cleocin T) 1 % lotion  Apply topically 2 times a day to entire face.  2. PRIMARY FOCAL HYPERHIDROSIS (2)  Left Hand - Anterior, Right Hand - Anterior  Increased perspiration on bilateral palmar hands.   -Patient notes increased sweating on her bilateral hands.   -Improved with aluminum chloride.   -Continue aluminum chloride 20% solution with improvement. Refills sent.   Related Medications  aluminum chloride (Drysol) 20 % external solution  Apply to palms once weekly overnight. Wash treated area in the morning.  3. MULTIPLE BENIGN NEVI  Generalized  Brown and tan macules and papules with reassuring findings on dermoscopy  -These lesions have benign, reassuring patterns on dermoscopy  -Recommend continued self observation, and to contact the office if any changes in nevi are noticed  4. LENTIGO  Generalized  Tan macules  -Benign appearing on exam  -Reassurance, recommend observation  5. HEMANGIOMA OF SKIN  Generalized  Cherry red papules  -Discussed the nature of the diagnosis  -Reassurance, recommend continued observation  6. SCREENING EXAM FOR SKIN CANCER  Generalized      Full body skin exam  -No lesions concerning for malignancy on the remainder the skin exam today   - The ugly duckling sign was discussed. Monitor  for any skin lesions that are different in color, shape, or size than others on body  -Sun protection was discussed. Recommend SPF 30+, hats with brims, sun protective clothing, and avoiding sun exposure between 10 AM and 2 PM whenever possible  -Recommend regular skin exams or sooner if new or changing lesions     Related Procedures  Follow Up In Dermatology - Established Patient  Follow Up In Dermatology - Established Patient    RTC in 1 year for follow up of acne and FSBE.     Zaida Alvarez,      I saw and evaluated the patient. I personally obtained the key and critical portions of the history and physical exam or was physically present for key and critical portions performed by the resident/fellow. I reviewed the resident/fellow's documentation and discussed the patient with the resident/fellow. I agree with the resident/fellow's medical decision making as documented in the note.    Mark Miller MD PhD

## 2025-06-12 RX ORDER — SPIRONOLACTONE 25 MG/1
25 TABLET ORAL DAILY
Qty: 30 TABLET | Refills: 11 | Status: SHIPPED | OUTPATIENT
Start: 2025-06-12 | End: 2026-06-12

## 2025-06-12 RX ORDER — CLINDAMYCIN PHOSPHATE 10 UG/ML
LOTION TOPICAL 2 TIMES DAILY
Qty: 30 ML | Refills: 11 | Status: SHIPPED | OUTPATIENT
Start: 2025-06-12 | End: 2026-06-11

## 2025-06-12 RX ORDER — SPIRONOLACTONE 100 MG/1
100 TABLET, FILM COATED ORAL DAILY
Qty: 30 TABLET | Refills: 11 | Status: SHIPPED | OUTPATIENT
Start: 2025-06-12

## 2025-07-01 PROCEDURE — RXMED WILLOW AMBULATORY MEDICATION CHARGE

## 2025-07-03 ENCOUNTER — PHARMACY VISIT (OUTPATIENT)
Dept: PHARMACY | Facility: CLINIC | Age: 30
End: 2025-07-03
Payer: COMMERCIAL

## 2025-07-03 PROCEDURE — RXMED WILLOW AMBULATORY MEDICATION CHARGE

## 2025-07-07 ENCOUNTER — PHARMACY VISIT (OUTPATIENT)
Dept: PHARMACY | Facility: CLINIC | Age: 30
End: 2025-07-07
Payer: COMMERCIAL

## 2025-07-31 PROCEDURE — RXMED WILLOW AMBULATORY MEDICATION CHARGE

## 2025-08-06 ENCOUNTER — PATIENT MESSAGE (OUTPATIENT)
Dept: OBSTETRICS AND GYNECOLOGY | Facility: CLINIC | Age: 30
End: 2025-08-06
Payer: COMMERCIAL

## 2025-08-08 ENCOUNTER — PHARMACY VISIT (OUTPATIENT)
Dept: PHARMACY | Facility: CLINIC | Age: 30
End: 2025-08-08
Payer: COMMERCIAL

## 2025-08-08 DIAGNOSIS — N93.8 DUB (DYSFUNCTIONAL UTERINE BLEEDING): Primary | ICD-10-CM

## 2025-08-08 DIAGNOSIS — T78.1XXD ADVERSE FOOD REACTION, SUBSEQUENT ENCOUNTER: Primary | ICD-10-CM

## 2025-08-08 PROCEDURE — RXMED WILLOW AMBULATORY MEDICATION CHARGE

## 2025-08-08 RX ORDER — EPINEPHRINE 2 MG/100UL
2 SPRAY NASAL ONCE AS NEEDED
Qty: 2 EACH | Refills: 2 | Status: SHIPPED | OUTPATIENT
Start: 2025-08-08

## 2025-08-08 RX ORDER — ESTRADIOL 1 MG/1
1 TABLET ORAL DAILY
Qty: 10 TABLET | Refills: 0 | Status: SHIPPED | OUTPATIENT
Start: 2025-08-08 | End: 2025-08-18

## 2025-08-08 NOTE — TELEPHONE ENCOUNTER
Spoke with patient.  Bleeding began during fourth week of her generic NuvaRing cycle.  Spotting has continued.  Discussed option of expectant management versus adding an estradiol for 10 days.  Patient will consider.  Prescription sent.

## 2025-08-13 ENCOUNTER — PHARMACY VISIT (OUTPATIENT)
Dept: PHARMACY | Facility: CLINIC | Age: 30
End: 2025-08-13
Payer: COMMERCIAL

## 2025-08-21 ENCOUNTER — OFFICE VISIT (OUTPATIENT)
Dept: OBSTETRICS AND GYNECOLOGY | Facility: CLINIC | Age: 30
End: 2025-08-21
Payer: COMMERCIAL

## 2025-08-21 VITALS
DIASTOLIC BLOOD PRESSURE: 90 MMHG | HEART RATE: 97 BPM | SYSTOLIC BLOOD PRESSURE: 143 MMHG | BODY MASS INDEX: 34.37 KG/M2 | HEIGHT: 63 IN

## 2025-08-21 DIAGNOSIS — N93.9 ABNORMAL UTERINE BLEEDING (AUB): Primary | ICD-10-CM

## 2025-08-21 PROCEDURE — 3077F SYST BP >= 140 MM HG: CPT | Performed by: STUDENT IN AN ORGANIZED HEALTH CARE EDUCATION/TRAINING PROGRAM

## 2025-08-21 PROCEDURE — 3080F DIAST BP >= 90 MM HG: CPT | Performed by: STUDENT IN AN ORGANIZED HEALTH CARE EDUCATION/TRAINING PROGRAM

## 2025-08-21 PROCEDURE — 1036F TOBACCO NON-USER: CPT | Performed by: STUDENT IN AN ORGANIZED HEALTH CARE EDUCATION/TRAINING PROGRAM

## 2025-08-21 PROCEDURE — 99204 OFFICE O/P NEW MOD 45 MIN: CPT | Performed by: STUDENT IN AN ORGANIZED HEALTH CARE EDUCATION/TRAINING PROGRAM

## 2025-08-21 PROCEDURE — 99202 OFFICE O/P NEW SF 15 MIN: CPT | Performed by: STUDENT IN AN ORGANIZED HEALTH CARE EDUCATION/TRAINING PROGRAM

## 2025-08-21 ASSESSMENT — ENCOUNTER SYMPTOMS
CARDIOVASCULAR NEGATIVE: 0
ENDOCRINE NEGATIVE: 0
HEMATOLOGIC/LYMPHATIC NEGATIVE: 0
PSYCHIATRIC NEGATIVE: 0
ALLERGIC/IMMUNOLOGIC NEGATIVE: 0
NEUROLOGICAL NEGATIVE: 0
CONSTITUTIONAL NEGATIVE: 0
GASTROINTESTINAL NEGATIVE: 0
MUSCULOSKELETAL NEGATIVE: 0
RESPIRATORY NEGATIVE: 0
EYES NEGATIVE: 0

## 2025-08-21 ASSESSMENT — PAIN SCALES - GENERAL: PAINLEVEL_OUTOF10: 0-NO PAIN

## 2025-08-29 DIAGNOSIS — Z59.9 FINANCIAL DIFFICULTIES: Primary | ICD-10-CM

## 2025-08-29 SDOH — ECONOMIC STABILITY - INCOME SECURITY: PROBLEM RELATED TO HOUSING AND ECONOMIC CIRCUMSTANCES, UNSPECIFIED: Z59.9

## 2025-08-30 DIAGNOSIS — E78.00 HYPERCHOLESTEROLEMIA: ICD-10-CM

## 2025-09-02 PROCEDURE — RXMED WILLOW AMBULATORY MEDICATION CHARGE

## 2025-09-02 RX ORDER — ROSUVASTATIN CALCIUM 10 MG/1
10 TABLET, COATED ORAL DAILY
Qty: 90 TABLET | Refills: 3 | Status: SHIPPED | OUTPATIENT
Start: 2025-09-02 | End: 2026-09-02

## 2025-09-03 DIAGNOSIS — I10 ESSENTIAL HYPERTENSION: ICD-10-CM

## 2025-09-03 PROBLEM — N93.9 ABNORMAL UTERINE BLEEDING (AUB): Status: ACTIVE | Noted: 2025-08-21

## 2025-09-03 PROCEDURE — RXMED WILLOW AMBULATORY MEDICATION CHARGE

## 2025-09-03 RX ORDER — VALSARTAN 80 MG/1
80 TABLET ORAL DAILY
Qty: 90 TABLET | Refills: 3 | Status: SHIPPED | OUTPATIENT
Start: 2025-09-03 | End: 2026-09-03

## 2025-09-04 ENCOUNTER — PHARMACY VISIT (OUTPATIENT)
Dept: PHARMACY | Facility: CLINIC | Age: 30
End: 2025-09-04
Payer: COMMERCIAL

## 2025-09-09 ENCOUNTER — APPOINTMENT (OUTPATIENT)
Dept: PHARMACY | Facility: HOSPITAL | Age: 30
End: 2025-09-09
Payer: COMMERCIAL

## 2025-09-24 ENCOUNTER — APPOINTMENT (OUTPATIENT)
Dept: OBSTETRICS AND GYNECOLOGY | Facility: CLINIC | Age: 30
End: 2025-09-24
Payer: COMMERCIAL